# Patient Record
Sex: MALE | Race: WHITE | NOT HISPANIC OR LATINO | URBAN - METROPOLITAN AREA
[De-identification: names, ages, dates, MRNs, and addresses within clinical notes are randomized per-mention and may not be internally consistent; named-entity substitution may affect disease eponyms.]

---

## 2021-03-26 ENCOUNTER — EMERGENCY (EMERGENCY)
Facility: HOSPITAL | Age: 86
LOS: 1 days | Discharge: HOME | End: 2021-03-26
Attending: EMERGENCY MEDICINE | Admitting: EMERGENCY MEDICINE
Payer: MEDICARE

## 2021-03-26 VITALS
WEIGHT: 175.93 LBS | OXYGEN SATURATION: 96 % | SYSTOLIC BLOOD PRESSURE: 158 MMHG | DIASTOLIC BLOOD PRESSURE: 94 MMHG | TEMPERATURE: 98 F | HEART RATE: 97 BPM | RESPIRATION RATE: 18 BRPM

## 2021-03-26 LAB
APPEARANCE UR: ABNORMAL
BILIRUB UR-MCNC: NEGATIVE — SIGNIFICANT CHANGE UP
COLOR SPEC: YELLOW — SIGNIFICANT CHANGE UP
DIFF PNL FLD: ABNORMAL
GLUCOSE UR QL: NEGATIVE — SIGNIFICANT CHANGE UP
KETONES UR-MCNC: ABNORMAL
LEUKOCYTE ESTERASE UR-ACNC: ABNORMAL
NITRITE UR-MCNC: NEGATIVE — SIGNIFICANT CHANGE UP
PH UR: 5 — SIGNIFICANT CHANGE UP (ref 5–8)
PROT UR-MCNC: 30 MG/DL
SP GR SPEC: 1.02 — SIGNIFICANT CHANGE UP (ref 1.01–1.02)
UROBILINOGEN FLD QL: NEGATIVE — SIGNIFICANT CHANGE UP

## 2021-03-26 PROCEDURE — 99284 EMERGENCY DEPT VISIT MOD MDM: CPT

## 2021-03-26 PROCEDURE — 81001 URINALYSIS AUTO W/SCOPE: CPT

## 2021-03-26 PROCEDURE — 99283 EMERGENCY DEPT VISIT LOW MDM: CPT

## 2021-03-26 PROCEDURE — 87086 URINE CULTURE/COLONY COUNT: CPT

## 2021-03-26 RX ORDER — LIDOCAINE HCL 20 MG/ML
5 VIAL (ML) INJECTION ONCE
Refills: 0 | Status: DISCONTINUED | OUTPATIENT
Start: 2021-03-26 | End: 2021-03-26

## 2021-03-26 RX ORDER — LIDOCAINE HCL 20 MG/ML
5 VIAL (ML) INJECTION ONCE
Refills: 0 | Status: COMPLETED | OUTPATIENT
Start: 2021-03-26 | End: 2021-03-26

## 2021-03-26 RX ORDER — PHENAZOPYRIDINE HCL 100 MG
1 TABLET ORAL
Qty: 14 | Refills: 0
Start: 2021-03-26 | End: 2021-04-01

## 2021-03-26 RX ORDER — PHENAZOPYRIDINE HCL 100 MG
200 TABLET ORAL ONCE
Refills: 0 | Status: COMPLETED | OUTPATIENT
Start: 2021-03-26 | End: 2021-03-26

## 2021-03-26 RX ADMIN — Medication 200 MILLIGRAM(S): at 22:00

## 2021-03-26 RX ADMIN — Medication 6 MILLILITER(S): at 21:13

## 2021-03-26 NOTE — ED PROVIDER NOTE - NSFOLLOWUPINSTRUCTIONS_ED_ALL_ED_FT
1. TAKE ALL MEDICATIONS AS DIRECTED.  CONTINUE CEFTIN AS DIRECTED.  FOR PAIN YOU CAN TAKE IBUPROFEN (MOTRIN, ADVIL) OR ACETAMINOPHEN (TYLENOL) AS NEEDED, AS DIRECTED ON PACKAGING.  2. FOLLOW UP WITH ___UROLOGY_______ AS DIRECTED.  YOU WERE GIVEN COPIES OF ALL LABS AND IMAGING RESULTS FROM YOUR ER VISIT--PLEASE TAKE THEM WITH YOU TO YOUR APPOINTMENT.  3. IF NEEDED, CALL [phone] TO FIND A PRIMARY CARE PHYSICIAN.  OR CALL [phone] TO MAKE AN APPOINTMENT WITH THE MEDICAL CLINIC.  4. RETURN TO THE ER FOR ANY WORSENING SYMPTOMS.      Madera Catheter Placement and Care    WHAT YOU NEED TO KNOW:    What is a Madera catheter? A Madera catheter is a sterile tube that is inserted into your bladder to drain urine. It is also called an indwelling urinary catheter. The tip of the catheter has a small balloon filled with solution that holds the catheter in your bladder.                     How is a Madera catheter placed? Your genital area will be cleaned to prevent infection. The catheter will be inserted into your urethra. When urine begins to flow into the tubing, the balloon is filled to keep the catheter in place. Then, the open end will be attached to a drainage bag.     How do I care for my catheter and drainage bag? You can reduce your risk for infection and injury by caring for your catheter and drainage bag properly.  •Wash your hands often. Wash before and after you touch your catheter, tubing, or drainage bag. Use soap and water. Wear clean disposable gloves when you care for your catheter or disconnect the drainage bag. Wash your hands before you prepare or eat food.   Handwashing           •Clean your genital area 2 times every day. Clean your catheter area and anal opening after every bowel movement. ?For men: Use a soapy cloth to clean the tip of your penis. Start where the catheter enters. Wipe backward making sure to pull back the foreskin. Then use a cloth with clear water in the same direction to clean away the soap.       ?For women: Use a soapy cloth to clean the area that the catheter enters your body. Make sure to separate your labia and wipe toward the anus. Then use a cloth with clear water and wipe in the same direction.       •Secure the catheter tube so you do not pull or move the catheter. This helps prevent pain and bladder spasms. Healthcare providers will show you how to use medical tape or a strap to secure the catheter tube to your body.       •Keep a closed drainage system. Your catheter should always be attached to the drainage bag to form a closed system. Do not disconnect any part of the closed system unless you need to change the bag.      •Keep the drainage bag below the level of your waist. This helps stop urine from moving back up the tubing and into your bladder. Do not loop or kink the tubing. This can cause urine to back up and collect in your bladder. Do not let the drainage bag touch or lie on the floor.      •Empty the drainage bag when needed. The weight of a full drainage bag can be painful. Empty the drainage bag every 3 to 6 hours or when it is ? full.       •Clean and change the drainage bag as directed. Ask your healthcare provider how often you should change the drainage bag and what cleaning solution to use. Wear disposable gloves when you change the bag. Do not allow the end of the catheter or tubing to touch anything. Clean the ends with an alcohol pad before you reconnect them.      What can I do if problems develop?   •No urine is draining into the bag: ?Check for kinks in the tubing and straighten them out.       ?Check the tape or strap used to secure the catheter tube to your skin. Make sure it is not blocking the tube.       ?Make sure you are not sitting or lying on the tubing.      ?Make sure the urine bag is hanging below the level of your waist.      •Urine leaks from or around the catheter, tubing, or drainage bag: Check if the closed drainage system has accidently come open or apart. Clean the catheter and tubing ends with a new alcohol pad and reconnect them.       When should I seek immediate care?   •Your catheter comes out.       •You suddenly have material that looks like sand in the tubing or drainage bag.      •No urine is draining into the bag and you have checked the system.      •You have pain in your hip, back, pelvis, or lower abdomen.      •You are confused or cannot think clearly.      When should I call my doctor?   •You have a fever.      •You have bladder spasms for more than 1 day after the catheter is placed.      •You see blood in the tubing or drainage bag.      •You have a rash or itching where the catheter tube is secured to your skin.      •Urine leaks from or around the catheter, tubing, or drainage bag.      •The closed drainage system has accidently come open or apart.       •You see a layer of crystals inside the tubing.      •You have questions or concerns about your condition or care.      CARE AGREEMENT:    You have the right to help plan your care. Learn about your health condition and how it may be treated. Discuss treatment options with your healthcare providers to decide what care you want to receive. You always have the right to refuse treatment. 1. TAKE ALL MEDICATIONS AS DIRECTED.  CONTINUE CEFTIN AS DIRECTED.  FOR PAIN YOU CAN TAKE IBUPROFEN (MOTRIN, ADVIL) OR ACETAMINOPHEN (TYLENOL) AS NEEDED, AS DIRECTED ON PACKAGING.  2. FOLLOW UP WITH ___UROLOGY_______ AS DIRECTED.  YOU WERE GIVEN COPIES OF ALL LABS AND IMAGING RESULTS FROM YOUR ER VISIT--PLEASE TAKE THEM WITH YOU TO YOUR APPOINTMENT.  3. IF NEEDED, CALL [phone] TO FIND A PRIMARY CARE PHYSICIAN.  OR CALL [phone] TO MAKE AN APPOINTMENT WITH THE MEDICAL CLINIC.  4. RETURN TO THE ER FOR ANY WORSENING SYMPTOMS. 1. TAKE ALL MEDICATIONS AS DIRECTED.  DRINK PLENTY OF WATER TO STAY HYDRATED. START TAKING PYRIDIUM, THIS MAY MAKE YOUR YELLOW ORANGE. CONTINUE CEFTIN AS DIRECTED (antibiotic).  FOR PAIN YOU CAN TAKE IBUPROFEN (MOTRIN, ADVIL) OR ACETAMINOPHEN (TYLENOL) AS NEEDED, AS DIRECTED ON PACKAGING.  2. FOLLOW UP WITH ___UROLOGY_______ AS DIRECTED.  YOU WERE GIVEN COPIES OF ALL LABS AND IMAGING RESULTS FROM YOUR ER VISIT--PLEASE TAKE THEM WITH YOU TO YOUR APPOINTMENT.  3. IF NEEDED, CALL [phone] TO FIND A PRIMARY CARE PHYSICIAN.  OR CALL [phone] TO MAKE AN APPOINTMENT WITH THE MEDICAL CLINIC.  4. RETURN TO THE ER FOR ANY WORSENING SYMPTOMS.

## 2021-03-26 NOTE — ED PROVIDER NOTE - PROGRESS NOTE DETAILS
MD less than 200 cc in bladder per bladder scan indicating pt is emptying bladder with each void. Patient and son demanding nicolas. Son is pain management physician and requesting nicolas, states father in 10/10 pain at home and is already on ceftin. Risk of infection, CAUTI, sepsis, inability to void discussed. Call placed to Dr Green, awaiting return call. Pt feeling better after nicolas placed.  Awaiting return call form Dr Green. Nicolas care discussed with pt and son who indicated understanding. Pt feeling worser after nicolas placed.  States his penis feels irraited and has constant urge to void. Patient now would like foely removed. Discussed with son who in agreement that nicolas should be removed. Will removed. Awaiting return call form Dr Green.

## 2021-03-26 NOTE — ED PROVIDER NOTE - OBJECTIVE STATEMENT
86 y/o M with hx a fib, BPH on Ceftin x 8 days (out of 14 days) for prostatitis presents to ED for c/o painful urination and urinary retention. Son at [facility]. States has been having frequent urination but when he goes is only urinating small amounts. When he voids having pain in penis and rates pain 10/10. Son sates pt doubled over in pain when this occurs. No pain at rest. Deneis pain now. Does not feel empty after void. No fever chills n/v/d. No back or flank pain.     Urology Dr Green

## 2021-03-26 NOTE — ED PROVIDER NOTE - CLINICAL SUMMARY MEDICAL DECISION MAKING FREE TEXT BOX
86 y/o M with hx BPH currently on ceftin for prostatitis presents to ED for difficulty urinating and pain with urination, no fever chills or hematuria, plan= bladder scan to check for retention, ua and culture and urology consult

## 2021-03-26 NOTE — ED PROVIDER NOTE - CARE PROVIDER_API CALL
Milo Green  UROLOGY  [address], [address]  [place] [address]  Phone: [phone]  Fax: [phone]  Follow Up Time: 1-3 Days

## 2021-03-26 NOTE — ED PROVIDER NOTE - PATIENT PORTAL LINK FT
You can access the FollowMyHealth Patient Portal offered by [facility] by registering at the following website: [email or web address]. By joining [employer]’s FollowMyHealth portal, you will also be able to view your health information using other applications (apps) compatible with our system.

## 2021-03-26 NOTE — ED PROVIDER NOTE - ATTENDING CONTRIBUTION TO CARE
86 yo male with hx a fib, bph, requesting dx wi uti/prostatitis, c/o difficulty urinating. patient reports when he passes urine  it is painful  patient states he is urinating 15 times a day, and burns during urinating  denies any hematuria  denies nausea or vomiting   nad  abd soft, non tender  bladder scan - approx 100cc urine    patient and son request nicolas; discussed risks, but they state that they came for nicolas placement;

## 2021-03-26 NOTE — ED ADULT NURSE REASSESSMENT NOTE - NS ED NURSE REASSESS COMMENT FT1
bladder scan shows approx 100-125ml urine in pts bladder.  pt and son demanding a nicolas be placed.  pt will f/u with  on Monday.  Nicolas inserted to leg bag.  pt was uncomfortable with nicolas in place.  pt then requested nicolas be removed.  nicolas removed, pt voiding freely.  small amount of blood and residual lidocaine noted at meatus.  pt educated small amount of blood is expected as pt is on eliquis.  pt also instructed expected color change as he was prescribed pyridium.  declined having v/s taken prior to d/c home.  pt very eager to leave department.  pt left unit accompanied by his son, ambulating independently without incident.

## 2021-03-28 LAB
CULTURE RESULTS: NO GROWTH — SIGNIFICANT CHANGE UP
SPECIMEN SOURCE: SIGNIFICANT CHANGE UP

## 2021-03-31 ENCOUNTER — EMERGENCY (EMERGENCY)
Facility: HOSPITAL | Age: 86
LOS: 1 days | Discharge: HOME | End: 2021-03-31
Attending: EMERGENCY MEDICINE | Admitting: EMERGENCY MEDICINE
Payer: MEDICARE

## 2021-03-31 VITALS
TEMPERATURE: 98 F | WEIGHT: 167.99 LBS | RESPIRATION RATE: 18 BRPM | DIASTOLIC BLOOD PRESSURE: 71 MMHG | OXYGEN SATURATION: 96 % | SYSTOLIC BLOOD PRESSURE: 146 MMHG | HEIGHT: 69 IN | HEART RATE: 82 BPM

## 2021-03-31 LAB
APPEARANCE UR: ABNORMAL
BACTERIA # UR AUTO: ABNORMAL
BILIRUB UR-MCNC: ABNORMAL
COLOR SPEC: ABNORMAL
DIFF PNL FLD: ABNORMAL
EPI CELLS # UR: SIGNIFICANT CHANGE UP
GLUCOSE UR QL: NEGATIVE — SIGNIFICANT CHANGE UP
KETONES UR-MCNC: NEGATIVE — SIGNIFICANT CHANGE UP
LEUKOCYTE ESTERASE UR-ACNC: ABNORMAL
NITRITE UR-MCNC: POSITIVE
PH UR: 6 — SIGNIFICANT CHANGE UP (ref 5–8)
PROT UR-MCNC: 100
RBC CASTS # UR COMP ASSIST: >50 /HPF (ref 0–4)
SP GR SPEC: 1.01 — SIGNIFICANT CHANGE UP (ref 1.01–1.02)
UROBILINOGEN FLD QL: 1
WBC UR QL: ABNORMAL

## 2021-03-31 PROCEDURE — 99284 EMERGENCY DEPT VISIT MOD MDM: CPT

## 2021-03-31 PROCEDURE — 74176 CT ABD & PELVIS W/O CONTRAST: CPT

## 2021-03-31 PROCEDURE — 87086 URINE CULTURE/COLONY COUNT: CPT

## 2021-03-31 PROCEDURE — 99284 EMERGENCY DEPT VISIT MOD MDM: CPT | Mod: 25

## 2021-03-31 PROCEDURE — 81001 URINALYSIS AUTO W/SCOPE: CPT

## 2021-03-31 PROCEDURE — 74176 CT ABD & PELVIS W/O CONTRAST: CPT | Mod: 26,MA

## 2021-03-31 PROCEDURE — 51702 INSERT TEMP BLADDER CATH: CPT

## 2021-03-31 NOTE — ED PROVIDER NOTE - ATTENDING CONTRIBUTION TO CARE
84 y/o M with recurrent UTI and retention sent in for nicolas.  pt to follow up with Dr. Green this week.  well appearing no distress  suprapubic tenderness to palpation  bladder distention    symptoms improved with nicolas insertion    nicolas, UA, CT

## 2021-03-31 NOTE — ED PROVIDER NOTE - NSFOLLOWUPINSTRUCTIONS_ED_ALL_ED_FT
Please follow up with Dr. Green continue your antibiotics and drink plenty of water   return to er for any worsening symptoms    Urinary Retention in Men    WHAT YOU NEED TO KNOW:    What is urinary retention? Urinary retention is a condition that develops when your bladder does not empty completely when you urinate.    Male Reproductive System         What causes urinary retention?   •An enlarged prostate      •Blockages, such as a stone, growth, or narrowing of your urethra      •A weak bladder muscle      •Nerve damage from diabetes, stroke, or spinal cord injury      •Bladder diverticula, which are pockets of urine that form in your bladder and do not empty      •Certain medicines, such as narcotics, antihistamines, or antidepressants      What are the signs and symptoms of urinary retention?   •Frequent urination, or the urge to urinate right after you finish      •An urge to urinate, but your urine does not come out or dribbles out slowly and weakly      •Frequent urine leaks that happen during the day or while you sleep      •Pain or pressure when you urinate      •Pain or stiffness in your abdomen, lower back, hips, or upper thighs      •Blood in your urine      How is urinary retention diagnosed? Your healthcare provider will ask about your health history and the medicines you take. He will press or tap on your lower abdomen. You may need any of the following tests:   •A digital rectal exam is when healthcare providers carefully feel the size of your prostate.      •A post void residual test will show how much urine is left in your bladder after you urinate. You will be asked to urinate and then healthcare providers will use a small ultrasound machine to check how much urine is left in your bladder.      •Blood or urine tests may show infection or prostate specific antigen (PSA) levels. PSA may be elevated in prostate cancer.      •An ultrasound uses sound waves to show pictures on a monitor. An ultrasound may be done to show bladder stones, infection, or other problems.      •A CT scan, or CAT scan, is a type of x-ray that is taken of your prostate, kidneys, and bladder. The pictures may show what is causing your urinary retention. You may be given a dye before the pictures are taken to help healthcare providers see the pictures better. Tell the healthcare provider if you have ever had an allergic reaction to contrast dye.      How is urinary retention treated?   •A Madera catheter is a tube put into your bladder to drain urine into a bag. Keep the bag below your waist. This will prevent urine from flowing back into your bladder and causing an infection or other problems. Also, keep the tube free of kinks so the urine will drain properly. Do not pull on the catheter. This can cause pain and bleeding, and may cause the catheter to come out.       •Medicines can help decrease the size of your prostate, fight infection, and help you urinate more easily.      •Surgery may be needed to treat the condition that is causing your urinary retention.       When should I contact my healthcare provider?   •You have a fever.      •You have pain when you urinate.      •You have blood in your urine.      •You have problems with your catheter.      •You have questions or concerns about your condition or care.      When should I seek immediate care or call 911?   •You have severe abdominal pain.      •You are breathing faster than usual.      •Your heartbeat is faster than usual.      •Your face, hands, feet, or ankles are swollen.       CARE AGREEMENT:    You have the right to help plan your care. Learn about your health condition and how it may be treated. Discuss treatment options with your healthcare providers to decide what care you want to receive. You always have the right to refuse treatment.

## 2021-03-31 NOTE — ED PROVIDER NOTE - PATIENT PORTAL LINK FT
You can access the FollowMyHealth Patient Portal offered by [facility] by registering at the following website: [email or web address]. By joining [employer]’s FollowMyHealth portal, you will also be able to view your health information using other applications (apps) compatible with our system.

## 2021-03-31 NOTE — ED ADULT NURSE NOTE - NSIMPLEMENTINTERV_GEN_ALL_ED
Implemented All Universal Safety Interventions:  [place] to call system. Call bell, personal items and telephone within reach. Instruct patient to call for assistance. Room bathroom lighting operational. Non-slip footwear when patient is off stretcher. Physically safe environment: no spills, clutter or unnecessary equipment. Stretcher in lowest position, wheels locked, appropriate side rails in place.

## 2021-03-31 NOTE — ED PROVIDER NOTE - CARE PROVIDER_API CALL
Milo Green  UROLOGY  [address], [address]  [place] [address]  Phone: [phone]  Fax: [phone]  Follow Up Time:

## 2021-03-31 NOTE — ED ADULT TRIAGE NOTE - CHIEF COMPLAINT QUOTE
patient came in ED from home BIBA with c/o dysuria X 12 days, today patient added he did not urinate. patient completed Cefuroxime PO and still takes Pyridium.

## 2021-03-31 NOTE — ED PROVIDER NOTE - CLINICAL SUMMARY MEDICAL DECISION MAKING FREE TEXT BOX
Pt is a 86 yo male with urinary retention will place nicolas orange tinge due to pyridium u/a sent ct scan ordered

## 2021-03-31 NOTE — ED PROVIDER NOTE - PROGRESS NOTE DETAILS
+ uti possible skewed since on pyridium pt already on ceftin   has f/u with Dr. Green + uti possible skewed since on pyridium pt already on ceftin   has f/u with Dr. Green in 2 days ct shows enlarged prostate d/c with nicolas leg bag

## 2021-03-31 NOTE — ED PROVIDER NOTE - OBJECTIVE STATEMENT
Pt is a 84 yo male with hx a fib on Eliquis, BPH on Ceftin for prostatitis presents to ED for c/o painful urination and urinary retention since 1 pm today. States has been having frequent urination but when he goes is only urinating small amounts. When he voids having pain in penis and rates pain 10/10. No fever chills n/v/d. No back or flank pain. Pt was here 4 days ago and refused to keep nicolas in has appt with Dr. Green in 2 days and was advised to get ct scan. Pt is on pyridium and ceftin.     Urology Dr Green

## 2021-04-01 PROBLEM — I48.91 UNSPECIFIED ATRIAL FIBRILLATION: Chronic | Status: ACTIVE | Noted: 2021-03-26

## 2021-04-01 PROBLEM — N40.0 BENIGN PROSTATIC HYPERPLASIA WITHOUT LOWER URINARY TRACT SYMPTOMS: Chronic | Status: ACTIVE | Noted: 2021-03-26

## 2021-04-01 LAB
CULTURE RESULTS: NO GROWTH — SIGNIFICANT CHANGE UP
SPECIMEN SOURCE: SIGNIFICANT CHANGE UP

## 2021-04-23 NOTE — ED ADULT NURSE NOTE - CAS TRG GENERAL NORM CIRC DET
Azathioprine Counseling:  I discussed with the patient the risks of azathioprine including but not limited to myelosuppression, immunosuppression, hepatotoxicity, lymphoma, and infections.  The patient understands that monitoring is required including baseline LFTs, Creatinine, possible TPMP genotyping and weekly CBCs for the first month and then every 2 weeks thereafter.  The patient verbalized understanding of the proper use and possible adverse effects of azathioprine.  All of the patient's questions and concerns were addressed. Strong peripheral pulses

## 2022-11-27 ENCOUNTER — INPATIENT (INPATIENT)
Facility: HOSPITAL | Age: 87
LOS: 3 days | Discharge: HOME | DRG: 871 | End: 2022-12-01
Attending: FAMILY MEDICINE | Admitting: FAMILY MEDICINE
Payer: MEDICARE

## 2022-11-27 VITALS
RESPIRATION RATE: 18 BRPM | TEMPERATURE: 102 F | WEIGHT: 173.06 LBS | SYSTOLIC BLOOD PRESSURE: 132 MMHG | OXYGEN SATURATION: 88 % | HEIGHT: 69 IN | HEART RATE: 101 BPM | DIASTOLIC BLOOD PRESSURE: 65 MMHG

## 2022-11-27 DIAGNOSIS — U07.1 COVID-19: ICD-10-CM

## 2022-11-27 DIAGNOSIS — I48.91 UNSPECIFIED ATRIAL FIBRILLATION: ICD-10-CM

## 2022-11-27 DIAGNOSIS — Z29.9 ENCOUNTER FOR PROPHYLACTIC MEASURES, UNSPECIFIED: ICD-10-CM

## 2022-11-27 DIAGNOSIS — Z79.899 OTHER LONG TERM (CURRENT) DRUG THERAPY: ICD-10-CM

## 2022-11-27 DIAGNOSIS — R31.9 HEMATURIA, UNSPECIFIED: ICD-10-CM

## 2022-11-27 DIAGNOSIS — J18.9 PNEUMONIA, UNSPECIFIED ORGANISM: ICD-10-CM

## 2022-11-27 DIAGNOSIS — N40.0 BENIGN PROSTATIC HYPERPLASIA WITHOUT LOWER URINARY TRACT SYMPTOMS: ICD-10-CM

## 2022-11-27 LAB
ALBUMIN SERPL ELPH-MCNC: 3 G/DL — LOW (ref 3.3–5)
ALP SERPL-CCNC: 87 U/L — SIGNIFICANT CHANGE UP (ref 40–120)
ALT FLD-CCNC: 37 U/L — SIGNIFICANT CHANGE UP (ref 12–78)
ANION GAP SERPL CALC-SCNC: 5 MMOL/L — SIGNIFICANT CHANGE UP (ref 5–17)
APPEARANCE UR: ABNORMAL
APTT BLD: 34.6 SEC — SIGNIFICANT CHANGE UP (ref 27.5–35.5)
AST SERPL-CCNC: 35 U/L — SIGNIFICANT CHANGE UP (ref 15–37)
BASE EXCESS BLDV CALC-SCNC: -1.8 MMOL/L — SIGNIFICANT CHANGE UP (ref -2–3)
BASOPHILS # BLD AUTO: 0 K/UL — SIGNIFICANT CHANGE UP (ref 0–0.2)
BASOPHILS NFR BLD AUTO: 0 % — SIGNIFICANT CHANGE UP (ref 0–2)
BILIRUB SERPL-MCNC: 1 MG/DL — SIGNIFICANT CHANGE UP (ref 0.2–1.2)
BILIRUB UR-MCNC: NEGATIVE — SIGNIFICANT CHANGE UP
BLOOD GAS COMMENTS, VENOUS: SIGNIFICANT CHANGE UP
BUN SERPL-MCNC: 9 MG/DL — SIGNIFICANT CHANGE UP (ref 7–23)
CALCIUM SERPL-MCNC: 8.2 MG/DL — LOW (ref 8.5–10.1)
CHLORIDE SERPL-SCNC: 108 MMOL/L — SIGNIFICANT CHANGE UP (ref 96–108)
CO2 SERPL-SCNC: 28 MMOL/L — SIGNIFICANT CHANGE UP (ref 22–31)
COLOR SPEC: YELLOW — SIGNIFICANT CHANGE UP
CREAT SERPL-MCNC: 0.83 MG/DL — SIGNIFICANT CHANGE UP (ref 0.5–1.3)
CRP SERPL-MCNC: 13 MG/L — HIGH
DIFF PNL FLD: ABNORMAL
EGFR: 85 ML/MIN/1.73M2 — SIGNIFICANT CHANGE UP
EOSINOPHIL # BLD AUTO: 0.11 K/UL — SIGNIFICANT CHANGE UP (ref 0–0.5)
EOSINOPHIL NFR BLD AUTO: 1 % — SIGNIFICANT CHANGE UP (ref 0–6)
EPI CELLS # UR: SIGNIFICANT CHANGE UP
FLUAV AG NPH QL: SIGNIFICANT CHANGE UP
FLUBV AG NPH QL: SIGNIFICANT CHANGE UP
GAS PNL BLDV: SIGNIFICANT CHANGE UP
GLUCOSE SERPL-MCNC: 110 MG/DL — HIGH (ref 70–99)
GLUCOSE UR QL: NEGATIVE — SIGNIFICANT CHANGE UP
HCO3 BLDV-SCNC: 22 MMOL/L — SIGNIFICANT CHANGE UP (ref 22–29)
HCT VFR BLD CALC: 37.2 % — LOW (ref 39–50)
HCT VFR BLD CALC: 38.4 % — LOW (ref 39–50)
HGB BLD-MCNC: 12.5 G/DL — LOW (ref 13–17)
HGB BLD-MCNC: 13 G/DL — SIGNIFICANT CHANGE UP (ref 13–17)
INR BLD: 1.22 RATIO — HIGH (ref 0.88–1.16)
KETONES UR-MCNC: NEGATIVE — SIGNIFICANT CHANGE UP
LACTATE SERPL-SCNC: 1.7 MMOL/L — SIGNIFICANT CHANGE UP (ref 0.7–2)
LEUKOCYTE ESTERASE UR-ACNC: NEGATIVE — SIGNIFICANT CHANGE UP
LYMPHOCYTES # BLD AUTO: 0.21 K/UL — LOW (ref 1–3.3)
LYMPHOCYTES # BLD AUTO: 2 % — LOW (ref 13–44)
MANUAL SMEAR VERIFICATION: SIGNIFICANT CHANGE UP
MCHC RBC-ENTMCNC: 31.6 PG — SIGNIFICANT CHANGE UP (ref 27–34)
MCHC RBC-ENTMCNC: 33.9 GM/DL — SIGNIFICANT CHANGE UP (ref 32–36)
MCV RBC AUTO: 93.2 FL — SIGNIFICANT CHANGE UP (ref 80–100)
MONOCYTES # BLD AUTO: 0 K/UL — SIGNIFICANT CHANGE UP (ref 0–0.9)
MONOCYTES NFR BLD AUTO: 0 % — LOW (ref 2–14)
NEUTROPHILS # BLD AUTO: 10.2 K/UL — HIGH (ref 1.8–7.4)
NEUTROPHILS NFR BLD AUTO: 87 % — HIGH (ref 43–77)
NEUTS BAND # BLD: 10 % — HIGH (ref 0–8)
NITRITE UR-MCNC: NEGATIVE — SIGNIFICANT CHANGE UP
NRBC # BLD: 0 — SIGNIFICANT CHANGE UP
NRBC # BLD: SIGNIFICANT CHANGE UP /100 WBCS (ref 0–0)
PCO2 BLDV: 33 MMHG — LOW (ref 42–55)
PH BLDV: 7.44 — HIGH (ref 7.32–7.43)
PH UR: 5 — SIGNIFICANT CHANGE UP (ref 5–8)
PLAT MORPH BLD: NORMAL — SIGNIFICANT CHANGE UP
PLATELET # BLD AUTO: 193 K/UL — SIGNIFICANT CHANGE UP (ref 150–400)
PO2 BLDV: 152 MMHG — HIGH (ref 25–45)
POTASSIUM SERPL-MCNC: 3.3 MMOL/L — LOW (ref 3.5–5.3)
POTASSIUM SERPL-SCNC: 3.3 MMOL/L — LOW (ref 3.5–5.3)
PROCALCITONIN SERPL-MCNC: 0.89 NG/ML — HIGH
PROT SERPL-MCNC: 6.5 G/DL — SIGNIFICANT CHANGE UP (ref 6–8.3)
PROT UR-MCNC: 30 MG/DL
PROTHROM AB SERPL-ACNC: 14.3 SEC — HIGH (ref 10.5–13.4)
RBC # BLD: 4.12 M/UL — LOW (ref 4.2–5.8)
RBC # FLD: 13.3 % — SIGNIFICANT CHANGE UP (ref 10.3–14.5)
RBC BLD AUTO: NORMAL — SIGNIFICANT CHANGE UP
RBC CASTS # UR COMP ASSIST: >50 /HPF (ref 0–4)
RSV RNA NPH QL NAA+NON-PROBE: SIGNIFICANT CHANGE UP
SAO2 % BLDV: 99.5 % — HIGH (ref 67–88)
SARS-COV-2 RNA SPEC QL NAA+PROBE: DETECTED
SODIUM SERPL-SCNC: 141 MMOL/L — SIGNIFICANT CHANGE UP (ref 135–145)
SP GR SPEC: 1.01 — SIGNIFICANT CHANGE UP (ref 1.01–1.02)
UROBILINOGEN FLD QL: NEGATIVE — SIGNIFICANT CHANGE UP
WBC # BLD: 10.52 K/UL — HIGH (ref 3.8–10.5)
WBC # FLD AUTO: 10.52 K/UL — HIGH (ref 3.8–10.5)
WBC UR QL: SIGNIFICANT CHANGE UP

## 2022-11-27 PROCEDURE — 99285 EMERGENCY DEPT VISIT HI MDM: CPT

## 2022-11-27 PROCEDURE — 71250 CT THORAX DX C-: CPT | Mod: 26,MA

## 2022-11-27 PROCEDURE — 71045 X-RAY EXAM CHEST 1 VIEW: CPT | Mod: 26

## 2022-11-27 PROCEDURE — 93010 ELECTROCARDIOGRAM REPORT: CPT

## 2022-11-27 PROCEDURE — 99223 1ST HOSP IP/OBS HIGH 75: CPT | Mod: GC

## 2022-11-27 RX ORDER — SODIUM CHLORIDE 9 MG/ML
2400 INJECTION INTRAMUSCULAR; INTRAVENOUS; SUBCUTANEOUS ONCE
Refills: 0 | Status: COMPLETED | OUTPATIENT
Start: 2022-11-27 | End: 2022-11-27

## 2022-11-27 RX ORDER — APIXABAN 2.5 MG/1
0 TABLET, FILM COATED ORAL
Qty: 0 | Refills: 0 | DISCHARGE

## 2022-11-27 RX ORDER — REMDESIVIR 5 MG/ML
100 INJECTION INTRAVENOUS EVERY 24 HOURS
Refills: 0 | Status: COMPLETED | OUTPATIENT
Start: 2022-11-28 | End: 2022-12-01

## 2022-11-27 RX ORDER — POLYETHYLENE GLYCOL 3350 17 G/17G
17 POWDER, FOR SOLUTION ORAL DAILY
Refills: 0 | Status: DISCONTINUED | OUTPATIENT
Start: 2022-11-27 | End: 2022-12-01

## 2022-11-27 RX ORDER — APIXABAN 2.5 MG/1
5 TABLET, FILM COATED ORAL
Refills: 0 | Status: DISCONTINUED | OUTPATIENT
Start: 2022-11-27 | End: 2022-12-01

## 2022-11-27 RX ORDER — TAMSULOSIN HYDROCHLORIDE 0.4 MG/1
0.4 CAPSULE ORAL AT BEDTIME
Refills: 0 | Status: DISCONTINUED | OUTPATIENT
Start: 2022-11-27 | End: 2022-12-01

## 2022-11-27 RX ORDER — AZITHROMYCIN 500 MG/1
500 TABLET, FILM COATED ORAL ONCE
Refills: 0 | Status: COMPLETED | OUTPATIENT
Start: 2022-11-27 | End: 2022-11-27

## 2022-11-27 RX ORDER — AZITHROMYCIN 500 MG/1
500 TABLET, FILM COATED ORAL EVERY 24 HOURS
Refills: 0 | Status: DISCONTINUED | OUTPATIENT
Start: 2022-11-28 | End: 2022-11-29

## 2022-11-27 RX ORDER — FINASTERIDE 5 MG/1
5 TABLET, FILM COATED ORAL DAILY
Refills: 0 | Status: DISCONTINUED | OUTPATIENT
Start: 2022-11-27 | End: 2022-12-01

## 2022-11-27 RX ORDER — ACETAMINOPHEN 500 MG
650 TABLET ORAL EVERY 6 HOURS
Refills: 0 | Status: DISCONTINUED | OUTPATIENT
Start: 2022-11-27 | End: 2022-12-01

## 2022-11-27 RX ORDER — CEFTRIAXONE 500 MG/1
1000 INJECTION, POWDER, FOR SOLUTION INTRAMUSCULAR; INTRAVENOUS ONCE
Refills: 0 | Status: COMPLETED | OUTPATIENT
Start: 2022-11-27 | End: 2022-11-27

## 2022-11-27 RX ORDER — POTASSIUM CHLORIDE 20 MEQ
40 PACKET (EA) ORAL EVERY 4 HOURS
Refills: 0 | Status: COMPLETED | OUTPATIENT
Start: 2022-11-27 | End: 2022-11-27

## 2022-11-27 RX ORDER — TAMSULOSIN HYDROCHLORIDE 0.4 MG/1
0 CAPSULE ORAL
Qty: 0 | Refills: 0 | DISCHARGE

## 2022-11-27 RX ORDER — DEXAMETHASONE 0.5 MG/5ML
6 ELIXIR ORAL DAILY
Refills: 0 | Status: DISCONTINUED | OUTPATIENT
Start: 2022-11-27 | End: 2022-12-01

## 2022-11-27 RX ORDER — ONDANSETRON 8 MG/1
4 TABLET, FILM COATED ORAL EVERY 8 HOURS
Refills: 0 | Status: DISCONTINUED | OUTPATIENT
Start: 2022-11-27 | End: 2022-12-01

## 2022-11-27 RX ORDER — LANOLIN ALCOHOL/MO/W.PET/CERES
3 CREAM (GRAM) TOPICAL AT BEDTIME
Refills: 0 | Status: DISCONTINUED | OUTPATIENT
Start: 2022-11-27 | End: 2022-12-01

## 2022-11-27 RX ORDER — REMDESIVIR 5 MG/ML
INJECTION INTRAVENOUS
Refills: 0 | Status: COMPLETED | OUTPATIENT
Start: 2022-11-27 | End: 2022-12-01

## 2022-11-27 RX ORDER — ACETAMINOPHEN 500 MG
650 TABLET ORAL ONCE
Refills: 0 | Status: COMPLETED | OUTPATIENT
Start: 2022-11-27 | End: 2022-11-27

## 2022-11-27 RX ORDER — CEFTRIAXONE 500 MG/1
1000 INJECTION, POWDER, FOR SOLUTION INTRAMUSCULAR; INTRAVENOUS EVERY 24 HOURS
Refills: 0 | Status: COMPLETED | OUTPATIENT
Start: 2022-11-28 | End: 2022-12-01

## 2022-11-27 RX ORDER — POTASSIUM CHLORIDE 20 MEQ
20 PACKET (EA) ORAL ONCE
Refills: 0 | Status: DISCONTINUED | OUTPATIENT
Start: 2022-11-27 | End: 2022-11-27

## 2022-11-27 RX ORDER — REMDESIVIR 5 MG/ML
200 INJECTION INTRAVENOUS EVERY 24 HOURS
Refills: 0 | Status: COMPLETED | OUTPATIENT
Start: 2022-11-27 | End: 2022-11-27

## 2022-11-27 RX ORDER — METOPROLOL TARTRATE 50 MG
25 TABLET ORAL EVERY 12 HOURS
Refills: 0 | Status: DISCONTINUED | OUTPATIENT
Start: 2022-11-27 | End: 2022-12-01

## 2022-11-27 RX ADMIN — Medication 650 MILLIGRAM(S): at 17:23

## 2022-11-27 RX ADMIN — Medication 40 MILLIEQUIVALENT(S): at 10:38

## 2022-11-27 RX ADMIN — Medication 40 MILLIEQUIVALENT(S): at 08:16

## 2022-11-27 RX ADMIN — FINASTERIDE 5 MILLIGRAM(S): 5 TABLET, FILM COATED ORAL at 14:04

## 2022-11-27 RX ADMIN — Medication 25 MILLIGRAM(S): at 17:23

## 2022-11-27 RX ADMIN — TAMSULOSIN HYDROCHLORIDE 0.4 MILLIGRAM(S): 0.4 CAPSULE ORAL at 21:07

## 2022-11-27 RX ADMIN — REMDESIVIR 500 MILLIGRAM(S): 5 INJECTION INTRAVENOUS at 10:49

## 2022-11-27 RX ADMIN — SODIUM CHLORIDE 2400 MILLILITER(S): 9 INJECTION INTRAMUSCULAR; INTRAVENOUS; SUBCUTANEOUS at 06:15

## 2022-11-27 RX ADMIN — AZITHROMYCIN 255 MILLIGRAM(S): 500 TABLET, FILM COATED ORAL at 06:45

## 2022-11-27 RX ADMIN — CEFTRIAXONE 100 MILLIGRAM(S): 500 INJECTION, POWDER, FOR SOLUTION INTRAMUSCULAR; INTRAVENOUS at 06:15

## 2022-11-27 RX ADMIN — APIXABAN 5 MILLIGRAM(S): 2.5 TABLET, FILM COATED ORAL at 17:23

## 2022-11-27 RX ADMIN — Medication 650 MILLIGRAM(S): at 05:45

## 2022-11-27 NOTE — CONSULT NOTE ADULT - ASSESSMENT
86y Male complaining of shortness of breath.    BPH  AF   86y Male complaining of shortness of breath.    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

## 2022-11-27 NOTE — H&P ADULT - PROBLEM SELECTOR PLAN 7
On Eliquis, additional DVT prophylaxis not needed at this time    Dispo  -PT consult. On Eliquis, additional DVT prophylaxis not needed at this time    Dispo  -PT/social work consult

## 2022-11-27 NOTE — ED PROVIDER NOTE - CARE PLAN
1 Principal Discharge DX:	Pneumonia  Secondary Diagnosis:	Hypoxia   Principal Discharge DX:	Pneumonia due to COVID-19 virus  Secondary Diagnosis:	Hypoxia

## 2022-11-27 NOTE — PHYSICAL THERAPY INITIAL EVALUATION ADULT - ADDITIONAL COMMENTS
Patient with some confusion. Per wife (at bedside) , [place] steps to enter, 1 flight of stairs to 2nd floor bedroom and bath, +R descending rail. No DME available in the home. Patient is a community ambulator; also rides his bicycle around the [place] community. Patient with no hx of falls.  Wife drives patient to social activities, and errands outside community.

## 2022-11-27 NOTE — ED ADULT NURSE NOTE - SUICIDE SCREENING QUESTION 2
[facility] Obstetrics Brief Operative Note    Pre-operative diagnosis: IUP at 39w0d  History of previous  x 2  Advanced maternal age  Inadequate prenatal care   Post-operative diagnosis: Same  Significant scarring of the uterus and bladder to the anterior abdominal wall   Procedure: Repeat low transverse  section   Surgeon: Franci Hay MD   Assistant(s): Gal Sheriff MD   Anesthesia: Spinal anesthesia   Estimated blood loss: QBL pending   Total IV fluids: 2000 mL   Blood transfusion: No transfusion was given during surgery   Total urine output: 200ml   Drains: [place] catheter   Specimens: None   Findings: Single liveborn female infant in cephalic presentation. Apgars of 8 and 9. Weight 3130 grams. There was significant scarring of the lower uterine segment and bladder to the anterior abdominal wall fascia that required extensive lysis of adhesions. Uterine serosa and small layer of muscle was disrupted superior to the incision related to lysis of adhesions that did require repair for hemostasis.  Normal ovaries and fallopian tubes. Based on surgical findings, I would strongly recommend additional C-sections being completed using a vertical midline incision. I did discuss this with the patient following completion of surgery.     Complications: None.   Condition: Infant stable, transferred to general care nursery  Mother stable, transfered to post-anesthesia recovery   Comments: See dictated operative report for full details     Ashley Hieronimus, MD [facility]  2019 10:20 AM            
No

## 2022-11-27 NOTE — ED ADULT NURSE NOTE - OBJECTIVE STATEMENT
patient a/o x 4 with a calm affect c/o shortness of breath and fever.  patient denies chest pains. patient noticed to have blood in urine, wife states that patient has had urinary issues in the past.  EKG completed, patient placed on cardiac monitor.  patient tolerating nasal cannula well

## 2022-11-27 NOTE — ED PROVIDER NOTE - LAB INTERPRETATION
Flu With COVID-19 By JOHNATHAN (11.27.22 @ 06:15)   SARS-CoV-2 Result: Detected: This Respiratory Panel uses polymerase chain reaction (PCR) to detect for   influenza A; influenza B; respiratory syncytial virus; and SARS-CoV-2.   Influenza A Result: NotDetec   Influenza B Result: NotDetec   Resp Syn Virus Result: NotDetec

## 2022-11-27 NOTE — ED PROVIDER NOTE - CLINICAL SUMMARY MEDICAL DECISION MAKING FREE TEXT BOX
Patient brought in by EMS for shortness of breath fever productive cough.  Patient denies headache chest pain abdominal pain nausea vomiting diarrhea dysuria.  plan sepsis work-up including EKG chest x-ray labs IV fluid Tylenol antibiotics

## 2022-11-27 NOTE — H&P ADULT - NSHPREVIEWOFSYSTEMS_GEN_ALL_CORE
Constitutional: + CHILLS. denies fever, sweating  HEENT: denies headache, dizziness, or lightheadedness  Respiratory: + SOB, cough, wheezing  Cardiovascular: denies CP, palpitations  Gastrointestinal: + Constipation. denies nausea, vomiting, diarrhea, abdominal pain, or bloody stools  Genitourinary: + Pain with urination  Skin/Breast: denies rashes or itching  Musculoskeletal: denies muscle aches, joint swelling, or muscle weakness  Neurologic: denies loss of sensation, numbness, or tingling  ROS negative except as noted above

## 2022-11-27 NOTE — H&P ADULT - PROBLEM SELECTOR PLAN 5
- Previously followed Dr. Green  - W/ history of repeated UTI, prostatitis, urinary retention requiring Madera ca  - Continue home tamsulosin w/ hold parameters - Previously followed Dr. Green  - W/ history of repeated UTI, prostatitis, urinary retention requiring Madera cath  - Continue home tamsulosin w/ hold parameters - Previously followed Dr. Green  - W/ history of repeated UTI, prostatitis, urinary retention requiring Madera cath  - Continue home tamsulosin w/ hold parameters (pending med rec) - Previously followed Dr. Green  - W/ history of repeated UTI, prostatitis, urinary retention requiring Madera cath  - Continue home tamsulosin w/ hold parameters, and Dutasteride therapeutic interchange

## 2022-11-27 NOTE — H&P ADULT - PROBLEM SELECTOR PLAN 3
W/ gross hematuria prior to presentation, UA positive for gross blood, >50RBC, 30 protein  - On Eliquis for afib  - Previously followed Dr. Green (urology)  - W/ history of repeated UTI, prostatitis, urinary retention requiring [place] cath  - Continuous bladder irrigation and repeat UA, urology consult if not resolved W/ gross hematuria prior to presentation, UA positive for gross blood, >50RBC, 30 protein  No gross hematuria observed on exam  - On Eliquis for afib  - Previously followed Dr. Green (urology)  - W/ history of repeated UTI, prostatitis, urinary retention requiring [place] cath  - Continuous bladder irrigation and repeat UA, urology consult if not resolved W/ gross hematuria prior to presentation, UA positive for gross blood, >50RBC, 30 protein  No gross hematuria observed on exam- dried blood noted on meatus  - On Eliquis for afib  - Previously followed Dr. Green (urology)  - Patient W/ history of repeated UTI, prostatitis, urinary retention requiring [place] cath  - Discussed with urology, patient with very large prostate- likely was irritated, if patient not having difficulty urinating, and no deepika blood in urine (urine clear in urinal at bedside), can continue eliquis and monitor H/H

## 2022-11-27 NOTE — H&P ADULT - PROBLEM SELECTOR PLAN 2
Pt meeting sepsis criteria on admission  - Satting well on 2L NC  - CXR as above  - XXXX since symptom onset   - maintain isolation precautions - airborne and contact  - monitor for signs and symptoms of worsening infection, SOB, O2 desaturation  - Remdesivir x3 days  - ID consult Dr. Barraza Pt meeting sepsis criteria on admission  - Satting well on 2L NC  - CXR as above  - 1 day since symptom onset   - maintain isolation precautions - airborne and contact  - monitor for signs and symptoms of worsening infection, SOB, O2 desaturation  - Remdesivir x3 days  - ID consult Dr. Barraza Pt meeting sepsis criteria on admission  - Satting well on 2L NC  - CXR as above  - 1 day since symptom onset   - maintain isolation precautions - airborne and contact  - monitor for signs and symptoms of worsening infection, SOB, O2 desaturation  - Remdesivir x3 days  - Oral decadron ordered by pulm  - ID consult Dr. Barraza Pt meeting sepsis criteria on admission  - Satting well on 2L NC  - CXR as above  - 1 day since symptom onset   - maintain isolation precautions - airborne and contact  - monitor for signs and symptoms of worsening infection, SOB, O2 desaturation  - Remdesivir x3 days  - Oral decadron ordered by pulm (Dr. Nunez)  - ID consult Dr. Barraza Pt meeting sepsis criteria on admission  - Satting well on 2L NC  - CXR as above  - 1 day since symptom onset   - maintain isolation precautions - airborne and contact  - monitor for signs and symptoms of worsening infection, SOB, O2 desaturation  - Remdesivir x3-5 days  - Oral decadron ordered by pulm (Dr. Nunez)  - ID consult Dr. Barraza

## 2022-11-27 NOTE — ED ADULT NURSE REASSESSMENT NOTE - NS ED NURSE REASSESS COMMENT FT1
Pt having bleeding from penis.  Dr Gruber aware.  Pt ok to Eliquis as per Dr Green as well.  H and H stable.
Received pt in bed alert and oriented.  Wife/son at bedside on and off.  Pt comfortable at this time.  VS stable.  02 95% 3L.  Awaiting bed

## 2022-11-27 NOTE — PHYSICAL THERAPY INITIAL EVALUATION ADULT - PERTINENT HX OF CURRENT PROBLEM, REHAB EVAL
Pt is an 85 y/o male with hx a fib on Eliquis and BPH who presents to the hospital c/o shortness of breath. The day prior to admission patient notes he began to experience chest pain that was non-radiating as well as SOB. Overnight the patient's wife reports that the patient tried to go to the bathroom and was off balance and experiencing "poor breathing." Pt endorses productive cough also x 1 day, brown/pink tinged. Patient and wife do not endorse any sick contacts. Associated symptoms include + chills, + productive cough, + "wheezing". Patient denies feeling febrile, CP, n/v, diarrhea.

## 2022-11-27 NOTE — H&P ADULT - NSHPSOCIALHISTORY_GEN_ALL_CORE
Lives w/ wife  Tobacco: Former, 80 pack year per son  Alcohol: Rare  Drugs: None  ADLs: Ambulates w/o assistance

## 2022-11-27 NOTE — H&P ADULT - PROBLEM SELECTOR PLAN 1
Pt symptomatic, febrile w/ leukocytosis and bandemia, tachycardia on admission w/ CXR findings--meets sepsis criteria  - S/p 2.4L NC, ceftriaxone, azithromycin in ED  - 11/27 CXR: Wet read increased opacities LLL and R lower lung  - 11/27 CT awaiting read  - Found to be COVID+, RVP negative otherwise  - Continue ceftriaxone and azithromycin for epimeric coverage  - Satting well on 2L NC; Continue O2 support, ween and titrate as needed  - Monitor for signs and symptoms of worsening infection, SOB, O2 desaturation  - Admit to tele Pt symptomatic, febrile w/ leukocytosis and bandemia, tachycardia on admission w/ CXR findings--meets sepsis criteria  - S/p 2.4L NC, ceftriaxone, azithromycin in ED  - 11/27 CXR: Wet read increased opacities LLL and R lower lung  - 11/27 CT awaiting read  - Found to be COVID+, RVP negative otherwise  - Continue ceftriaxone and azithromycin for epimeric coverage  - F/u BCx x2 and UCx, MRSA PCR  - Satting well on 2L NC; Continue O2 support, ween and titrate as needed  - Monitor for signs and symptoms of worsening infection, SOB, O2 desaturation  - Tylenol PRN for fever  - Admit to tele Pt symptomatic, febrile w/ leukocytosis and bandemia, tachycardia on admission w/ CXR findings--meets sepsis criteria  - S/p 2.4L NC, ceftriaxone, azithromycin in ED  - 11/27 CXR: Wet read increased opacities LLL and R lower lung  - 11/27 CT awaiting read  - Found to be COVID+, RVP negative otherwise  - Continue ceftriaxone and azithromycin for epimeric coverage  - F/u BCx x2 and UCx, MRSA PCR, Legionella/Strep antigen  - Satting well on 2L NC; Continue O2 support, ween and titrate as needed  - Monitor for signs and symptoms of worsening infection, SOB, O2 desaturation  - Tylenol PRN for fever  - Admit to tele Pt symptomatic, febrile w/ leukocytosis and bandemia, tachycardia on admission w/ CXR findings--meets sepsis criteria  - S/p 2.4L NC, ceftriaxone, azithromycin in ED  - 11/27 CXR: Wet read increased opacities LLL and R lower lung  - 11/27 CT awaiting read  - Found to be COVID+, RVP negative otherwise  - Continue ceftriaxone and azithromycin for epimeric coverage  - F/u BCx x2 and UCx, MRSA PCR, Legionella/Strep antigen  - Satting well on 2L NC; Continue O2 support, ween and titrate as needed  - Monitor for signs and symptoms of worsening infection, SOB, O2 desaturation  - Tylenol PRN for fever  - Pulmonology on board, recs appreciated  - Admit to tele

## 2022-11-27 NOTE — PHYSICAL THERAPY INITIAL EVALUATION ADULT - GAIT DISTANCE, PT EVAL
17 yo female with to [employer] earlier today and then noticed her left labia minora swollen, felt uncomfortable, swelling has increased over few hours, no pain, redness. No fever, dysuria or trauma reported. She did not have any new food. She is on BCP and has no menstrual period for the last 7 months, sexually active with no other protection. One partner, denies using drugs. She has mild yeast infection for the past 2 weeks, not treated and she is going to [place] with family tomorrow morning. 5ft x 2/5 feet

## 2022-11-27 NOTE — H&P ADULT - ATTENDING COMMENTS
85 y/o male with hx a fib on Eliquis and BPH who presents to the hospital c/o shortness of breath with purulent sputum, fever, leukocytosis, and lobar opacities on CXR admitted for the work up and management of CAP vs. COVID-19 vs. superimposed CAP on COVID-19    #Acute respiratory failure with hypoxia  #Sepsis  #Pneumonia  #COVID-19  #Afib  #BPH  #hematuria    -Patient presented with acute respiratory failure with hypoxia, currently saturating well on supplemental O2, wean as tolerated  -CT showing LLL consolidation also with peripheral groundglass opacities (personal reads)- possible superimposed CAP on COVID- continue IV abx for now. Start remdesivir and steroids for COVID-19. Pulma nd ID consulted, appreciate recs  -Patient and wife unsure about medication history, son is a physician, will go home and call me with full med list, for now, continue metoprolol (with hold parameters), tamsulosin, finasteride and eliquis  -Patient complaining of hematuria, urinal at bedside with yellow urine, however UA showing large blood and >50 RBCs. Dried blood also noted on patient's meatus and underwear. Discussed with patient's outpatient Urologist (Dr. Green)- states patient has very large prostate which could have been irritated, if patient able to urinate and no gross blood in urine, can continue eliquis and monitor H/H at this time    -Plan of care discussed with son and wife at bedside.

## 2022-11-27 NOTE — CONSULT NOTE ADULT - ASSESSMENT
86 m h/o a fib on Eliquis and BPH who presents to the hospital c/o shortness of breath. The day prior to admission patient notes he began to experience chest pain that was non-radiating as well as SOB.   hypoxia in ED 88%  covid 19 + superimposed bacterial pna on ct with left sided infiltrate  bandemia     plan  supportive oxygen  agree with remdesivir- trend lft  decadron day 1 of 10  ac - eliquis    agree with antibx   ceftriaxone and azithromycin  check urine legionella if neg stop azithromycin  trend biomarkers   86 m h/o a fib on Eliquis and BPH who presents to the hospital c/o shortness of breath. The day prior to admission patient notes he began to experience chest pain that was non-radiating as well as SOB.   hypoxia in ED 88%  covid 19 + superimposed bacterial pna on ct with left sided infiltrate  bandemia     plan  supportive oxygen  agree with remdesivir- trend lft  decadron day 1 of 10  ac - eliquis    agree with antibx   ceftriaxone and azithromycin  check urine legionella if neg stop azithromycin  trend biomarkers    family member at bedside is a physician questions answered

## 2022-11-27 NOTE — ED ADULT NURSE REASSESSMENT NOTE - NSIMPLEMENTINTERV_GEN_ALL_ED
Implemented All Fall with Harm Risk Interventions:  [place] to call system. Call bell, personal items and telephone within reach. Instruct patient to call for assistance. Room bathroom lighting operational. Non-slip footwear when patient is off stretcher. Physically safe environment: no spills, clutter or unnecessary equipment. Stretcher in lowest position, wheels locked, appropriate side rails in place. Provide visual cue, wrist band, yellow gown, etc. Monitor gait and stability. Monitor for mental status changes and reorient to person, place, and time. Review medications for side effects contributing to fall risk. Reinforce activity limits and safety measures with patient and family. Provide visual clues: red socks.

## 2022-11-27 NOTE — H&P ADULT - HISTORY OF PRESENT ILLNESS
IN THE ED:  Temp 101.8F, , /65, RR 18, 88 SpO2 on room air  Labs significant for: WBC 10.5 (neutrophil predominant) w/ 10% bands, Potassium 3.3, COVID+, UA: Large blood w/ >50 RBC.   Imaging  CXR:  EKG:  Received in ED:  Consults:  patient a/o x 4 with a calm affect c/o shortness of breath and fever.  patient denies chest pains. patient noticed to have blood in urine, wife states that patient has had urinary issues in the past.  EKG completed, patient placed on cardiac monitor.  patient tolerating nasal cannula well    IN THE ED:  Temp 101.8F, , /65, RR 18, 88 SpO2 on room air  Labs significant for: WBC 10.5 (neutrophil predominant) w/ 10% bands, Potassium 3.3, COVID+, UA: Large blood w/ >50 RBC, 30 protein.   Imaging  CXR: Wet read increased opacity in LLL and RLL  EKG:   Received in ED: Azithromycin IV, Ceftriaxone IV, NS 2.4L  Consults: Pulmonology Pt is an 87 y/o male with hx a fib on Eliquis and BPH who presents to the hospital c/o shortness of breath. The day prior to admission patient notes he began to experience chest pain that was non-radiating as well as SOB. Overnight the patient's wife reports that the patient tried to go to the bathroom and was off balance and experiencing "poor breathing." Pt endorses productive cough also x 1 day, brown/pink tinged. Patient and wife do not endorse any sick contacts. Associated symptoms include + chills, + productive cough, + "wheezing". Patient denies feeling febrile, CP, n/v, diarrhea.    IN THE ED:  Temp 101.8F, , /65, RR 18, 88 SpO2 on room air  Labs significant for: WBC 10.5 (neutrophil predominant) w/ 10% bands, Potassium 3.3, COVID+, UA: Large blood w/ >50 RBC, 30 protein.   Imaging  CXR: Wet read increased opacity in LLL and RLL  EKG: Afib, rate 107  Received in ED: Azithromycin IV, Ceftriaxone IV, NS 2.4L  Consults: Pulmonology

## 2022-11-27 NOTE — CONSULT NOTE ADULT - SUBJECTIVE AND OBJECTIVE BOX
Date/Time Patient Seen:  		  Referring MD:   Data Reviewed	       Patient is a 86y old  Male who presents with a chief complaint of     Subjective/HPI   · Chief Complaint: The patient is a 86y Male complaining of shortness of breath.  · HPI Objective Statement: Patient brought in by EMS for shortness of breath fever productive cough.  Patient denies headache chest pain abdominal pain nausea vomiting diarrhea dysuria.  pmd Marcos  · Presenting Symptoms: COUGH, FEVER, SHORTNESS OF BREATH  · Negative Findings: no chest pain, no headache  · Duration: today  · Quality: alteration in breathing pattern, productive cough  · Context: unknown  · Recent Exposure To: none known  · Aggravated Factors: none  · Relieving Factors: none    PAST MEDICAL & SURGICAL HISTORY:  Afib    BPH (benign prostatic hyperplasia)          Medication list         MEDICATIONS  (STANDING):  azithromycin  IVPB 500 milliGRAM(s) IV Intermittent once    MEDICATIONS  (PRN):         Vitals log        ICU Vital Signs Last 24 Hrs  T(C): 38.8 (27 Nov 2022 04:42), Max: 38.8 (27 Nov 2022 04:42)  T(F): 101.8 (27 Nov 2022 04:42), Max: 101.8 (27 Nov 2022 04:42)  HR: 101 (27 Nov 2022 04:42) (101 - 101)  BP: 132/65 (27 Nov 2022 04:42) (132/65 - 132/65)  BP(mean): --  ABP: --  ABP(mean): --  RR: 18 (27 Nov 2022 04:42) (18 - 18)  SpO2: 88% (27 Nov 2022 04:42) (88% - 88%)    O2 Parameters below as of 27 Nov 2022 04:42  Patient On (Oxygen Delivery Method): room air                 Input and Output:  I&O's Detail      Lab Data                        13.0   10.52 )-----------( 193      ( 27 Nov 2022 06:00 )             38.4                   Review of Systems	      Objective     Physical Examination        Pertinent Lab findings & Imaging      [place]:  NO   Adequate UO     I&O's Detail           Discussed with:     Cultures:	        Radiology      EXAM:  CT RENAL STONE HUNT                            PROCEDURE DATE:  03/31/2021          INTERPRETATION:  CLINICAL INFORMATION: Dysuria for 12 days    COMPARISON: None.    CONTRAST/COMPLICATIONS:  IV Contrast: NONE  0 cc administered   0 cc discarded  Oral Contrast: NONE  Complications: None reported at time of study completion    PROCEDURE:  CT of the Abdomen and Pelvis was performed.  Sagittal and coronal reformats were performed.    Note: The exam is limited because some types of pathology may not be adequately demonstrated due to lack of contrast enhancement.    FINDINGS:  LOWER CHEST: Calcified and noncalcified pleural plaques with areas of pleural parenchymal scarring..    LIVER: Unremarkable, unenhanced appearance  BILE DUCTS: Unremarkable, unenhanced appearance  GALLBLADDER: Unremarkable, unenhanced appearance  SPLEEN: Unremarkable, unenhanced appearance  PANCREAS: Unremarkable, unenhanced appearance  ADRENALS: Unremarkable, unenhanced appearance  KIDNEYS/URETERS: Bilateral nonobstructing nephrolithiasis. Borderline mild right hydroureter in the setting of partially duplicated renal collecting system.    BLADDER: Decompressed by [place] catheter  REPRODUCTIVE ORGANS: Prostatomegaly    BOWEL: Diverticulosis coli. No bowel obstruction. Normal appendix.  PERITONEUM: No free air or ascites  VESSELS: Atherosclerotic change of the abdominal aorta without aneurysm. Calcified plaque is present in the mesenteric and renal arteries.  RETROPERITONEUM/LYMPH NODES: No hemorrhage. No enlarged lymph node measuring greater than 10 mm in short axis  ABDOMINAL WALL: Tiny fat-containing periumbilical hernia. Small fat-containing right inguinal hernia, causes mass effect on adjacent small bowel.  BONES: Degenerative changes in the spine and bilateral hips    IMPRESSION:    1. 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 Correlation with urinalysis is advised to exclude ascending urinary tract infection.  2. Bilateral nonobstructing nephrolithiasis. No evidence of ureterolithiasis  3. Severe prostatomegaly.              JULIA PATEL MD; Attending Radiologist  This document has been electronically signed. Mar 31 2021 10:35PM                         Date/Time Patient Seen:  		  Referring MD:   Data Reviewed	       Patient is a 86y old  Male who presents with a chief complaint of     Subjective/HPI  in bed  seen and examined  vs noted  labs reviewed  imaging reviewed  sob  monterroso  weakness  fever  chills  on o2 support    retired  lives at home  asbestos exp     · Chief Complaint: The patient is a 86y Male complaining of shortness of breath.  · HPI Objective Statement: Patient brought in by EMS for shortness of breath fever productive cough.  Patient denies headache chest pain abdominal pain nausea vomiting diarrhea dysuria.  pmd Marcos  · Presenting Symptoms: COUGH, FEVER, SHORTNESS OF BREATH  · Negative Findings: no chest pain, no headache  · Duration: today  · Quality: alteration in breathing pattern, productive cough  · Context: unknown  · Recent Exposure To: none known  · Aggravated Factors: none  · Relieving Factors: none    PAST MEDICAL & SURGICAL HISTORY:  Afib    BPH (benign prostatic hyperplasia)          Medication list         MEDICATIONS  (STANDING):  azithromycin  IVPB 500 milliGRAM(s) IV Intermittent once    MEDICATIONS  (PRN):         Vitals log        ICU Vital Signs Last 24 Hrs  T(C): 38.8 (27 Nov 2022 04:42), Max: 38.8 (27 Nov 2022 04:42)  T(F): 101.8 (27 Nov 2022 04:42), Max: 101.8 (27 Nov 2022 04:42)  HR: 101 (27 Nov 2022 04:42) (101 - 101)  BP: 132/65 (27 Nov 2022 04:42) (132/65 - 132/65)  BP(mean): --  ABP: --  ABP(mean): --  RR: 18 (27 Nov 2022 04:42) (18 - 18)  SpO2: 88% (27 Nov 2022 04:42) (88% - 88%)    O2 Parameters below as of 27 Nov 2022 04:42  Patient On (Oxygen Delivery Method): room air                 Input and Output:  I&O's Detail      Lab Data                        13.0   10.52 )-----------( 193      ( 27 Nov 2022 06:00 )             38.4                   Review of Systems	  cough  fever  chills      Objective     Physical Examination    heart s1s2  lung dec BS  abd soft  head nc  verbal  alert  oriented  on o2 support      Pertinent Lab findings & Imaging      [place]:  NO   Adequate UO     I&O's Detail           Discussed with:     Cultures:	        Radiology      EXAM:  CT RENAL STONE HUNT                            PROCEDURE DATE:  03/31/2021          INTERPRETATION:  CLINICAL INFORMATION: Dysuria for 12 days    COMPARISON: None.    CONTRAST/COMPLICATIONS:  IV Contrast: NONE  0 cc administered   0 cc discarded  Oral Contrast: NONE  Complications: None reported at time of study completion    PROCEDURE:  CT of the Abdomen and Pelvis was performed.  Sagittal and coronal reformats were performed.    Note: The exam is limited because some types of pathology may not be adequately demonstrated due to lack of contrast enhancement.    FINDINGS:  LOWER CHEST: Calcified and noncalcified pleural plaques with areas of pleural parenchymal scarring..    LIVER: Unremarkable, unenhanced appearance  BILE DUCTS: Unremarkable, unenhanced appearance  GALLBLADDER: Unremarkable, unenhanced appearance  SPLEEN: Unremarkable, unenhanced appearance  PANCREAS: Unremarkable, unenhanced appearance  ADRENALS: Unremarkable, unenhanced appearance  KIDNEYS/URETERS: Bilateral nonobstructing nephrolithiasis. Borderline mild right hydroureter in the setting of partially duplicated renal collecting system.    BLADDER: Decompressed by [place] catheter  REPRODUCTIVE ORGANS: Prostatomegaly    BOWEL: Diverticulosis coli. No bowel obstruction. Normal appendix.  PERITONEUM: No free air or ascites  VESSELS: Atherosclerotic change of the abdominal aorta without aneurysm. Calcified plaque is present in the mesenteric and renal arteries.  RETROPERITONEUM/LYMPH NODES: No hemorrhage. No enlarged lymph node measuring greater than 10 mm in short axis  ABDOMINAL WALL: Tiny fat-containing periumbilical hernia. Small fat-containing right inguinal hernia, causes mass effect on adjacent small bowel.  BONES: Degenerative changes in the spine and bilateral hips    IMPRESSION:    1. 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 Correlation with urinalysis is advised to exclude ascending urinary tract infection.  2. Bilateral nonobstructing nephrolithiasis. No evidence of ureterolithiasis  3. Severe prostatomegaly.              JULIA PATEL MD; Attending Radiologist  This document has been electronically signed. Mar 31 2021 10:35PM

## 2022-11-27 NOTE — H&P ADULT - PROBLEM SELECTOR PLAN 4
Patient with chronic asymptomatic atrial fibrillation,  - Rate Control: None  - Anticoagulation: On Eliquis  - Monitor and replete electrolytes   - K 3.3 on admission, 40mEq x2 K ordered Patient with chronic asymptomatic atrial fibrillation  - Rate Control: None  - Anticoagulation: On Eliquis  - Monitor and replete electrolytes   - K 3.3 on admission, 40mEq x2 K ordered Patient with chronic asymptomatic atrial fibrillation  - Rate Control: Metoprolol succinate ER 25mg BID (pending med rec)  - Anticoagulation: On Eliquis 5mg BID (pending med rec)  - Monitor and replete electrolytes   - K 3.3 on admission, 40mEq x2 K ordered Patient with chronic asymptomatic atrial fibrillation  - Rate Control: Metoprolol succinate ER 25mg BID  - Anticoagulation: On Eliquis 5mg BID  - Monitor and replete electrolytes   - K 3.3 on admission, 40mEq x2 K ordered

## 2022-11-27 NOTE — H&P ADULT - PROBLEM SELECTOR PLAN 6
On Eliquis, additional DVT prophylaxis not needed at this time    Dispo  -PT consult. -Pt and family do not know what medications pt has taken  -Possibly on: Tamsulosin 0.4mg OCT 25 2022 qD, Dutesteride 0.5mg July 27th qD, Metoprolol succinate ER BID 25mg June 2nd 2022: Elliquis 5mg BID May 2022.  -Family states [facility] uses [facility] and no medications have changed since last visit, however this is not true as [facility] has had interem medications picked up since last visit to ED  -[facility], left callback # for med rec  -No records on [facility]scripts (outpt)  -Called pharmacy for med rec  -Follow up meds -Pt and family do not know what medications pt has taken  -Possibly on: Tamsulosin 0.4mg OCT 25 2022 qD, Dutesteride 0.5mg July 27th qD, Metoprolol succinate ER BID 25mg June 2nd 2022: Elliquis 5mg BID May 2022.  -Family states [facility] and no medications have changed since last visit. This is not true as [facility]) has had interem medications picked up since last visit to ED  -[facility], left callback # for med rec  -No records on [facility] (outpt)  -Family instructed to bring in meds, may have been lost  -Called pharmacy for med rec  -Follow up med rec -Pt and family do not know what medications pt has taken  -Possibly on: Tamsulosin 0.4mg OCT 25 2022 qD, Dutesteride 0.5mg July 27th qD, Metoprolol succinate ER BID 25mg June 2nd 2022: Elliquis 5mg BID May 2022.  -[facility] states [facility] and no medications have changed since last visit. This is not true as [facility] has had interem medications picked up since last visit to ED  -[facility], left callback # for med rec  -No records on [facility]scripts (outpt)  -Family instructed to bring in meds, may have been lost during transport on the way to the hospital  -Called pharmacy for med rec  -Follow up med rec

## 2022-11-27 NOTE — H&P ADULT - ASSESSMENT
87 y/o male with hx a fib on Eliquis and BPH who presents to the hospital c/o shortness of breath with purulent sputum, fever, leukocytosis, and lobar opacities on CXR admitted for the work up and management of CAP vs. COVID-19 vs. superimposed CAP on COVID-19

## 2022-11-27 NOTE — PATIENT PROFILE ADULT - FALL HARM RISK - RISK INTERVENTIONS
Assistance OOB with selected safe patient handling equipment/Assistance with ambulation/Communicate Fall Risk and Risk Factors to all staff, patient, and family/Discuss with provider need for PT consult/Monitor gait and stability/Reinforce activity limits and safety measures with patient and family/Visual Cue: Yellow wristband/Bed in lowest position, wheels locked, appropriate side rails in place/Call bell, personal items and telephone in reach/Instruct patient to call for assistance before getting out of bed or chair/Non-slip footwear when patient is out of bed/[place] to call system/Physically safe environment - no spills, clutter or unnecessary equipment/Purposeful Proactive Rounding/Room/bathroom lighting operational, light cord in reach

## 2022-11-27 NOTE — H&P ADULT - NSHPPHYSICALEXAM_GEN_ALL_CORE
VITALS:  Vital Signs Last 24 Hrs  T(C): 36.8 (27 Nov 2022 08:19), Max: 38.8 (27 Nov 2022 04:42)  T(F): 98.3 (27 Nov 2022 08:19), Max: 101.8 (27 Nov 2022 04:42)  HR: 93 (27 Nov 2022 08:19) (90 - 101)  BP: 116/76 (27 Nov 2022 08:19) (116/76 - 132/65)  BP(mean): --  RR: 17 (27 Nov 2022 08:19) (17 - 18)  SpO2: 95% (27 Nov 2022 08:19) (88% - 95%)    Parameters below as of 27 Nov 2022 08:19  Patient On (Oxygen Delivery Method): nasal cannula  O2 Flow (L/min): 3      PHYSICAL EXAM:    GENERAL: patient appears in no acute distress, appropriately interactive  EYES: EOMI b/l, sclera clear, no exudates  HEENT: NC/AT  LUNGS: mild ronchi - good air entry b/l, symmetric breath sounds, no wheezing appreciated  HEART: Irregularly irregular. +S1/S2, no murmurs, rubs, or gallops  GASTROINTESTINAL: abdomen is soft, NTND, no palpable masses  INTEGUMENT: good skin turgor, appears well perfused, no visualized rashes, no jaundice noted  EXTREMITIES: no clubbing or cyanosis, no obvious deformity, 2+ peripheral pulses  NEUROLOGIC: AA&O x3, motor strength grossly intact, no obvious sensory deficits  PSYCHIATRIC: mood is good, affect is congruent with mood, linear and logical thought process  HEME/LYMPH: no obvious ecchymosis VITALS:  Vital Signs Last 24 Hrs  T(C): 36.8 (27 Nov 2022 08:19), Max: 38.8 (27 Nov 2022 04:42)  T(F): 98.3 (27 Nov 2022 08:19), Max: 101.8 (27 Nov 2022 04:42)  HR: 93 (27 Nov 2022 08:19) (90 - 101)  BP: 116/76 (27 Nov 2022 08:19) (116/76 - 132/65)  BP(mean): --  RR: 17 (27 Nov 2022 08:19) (17 - 18)  SpO2: 95% (27 Nov 2022 08:19) (88% - 95%)    Parameters below as of 27 Nov 2022 08:19  Patient On (Oxygen Delivery Method): nasal cannula  O2 Flow (L/min): 3      PHYSICAL EXAM:    GENERAL: patient appears in no acute distress, appropriately interactive  EYES: EOMI b/l, sclera clear, no exudates  HEENT: NC/AT  LUNGS: mild ronchi - good air entry b/l, symmetric breath sounds, no wheezing appreciated  HEART: Irregularly irregular. +S1/S2, no murmurs, rubs, or gallops  GASTROINTESTINAL: abdomen is soft, NTND, no palpable masses  INTEGUMENT: good skin turgor, appears well perfused, no visualized rashes, no jaundice noted  EXTREMITIES: no clubbing or cyanosis, no obvious deformity, 2+ peripheral pulses  NEUROLOGIC: AA&O x3, motor strength grossly intact, no obvious sensory deficits  PSYCHIATRIC: mood is good, affect is congruent with mood, linear and logical thought process  HEME/LYMPH: no obvious ecchymosis  : No gross hematuria

## 2022-11-27 NOTE — ED PROVIDER NOTE - OBJECTIVE STATEMENT
Patient brought in by EMS for shortness of breath fever productive cough.  Patient denies headache chest pain abdominal pain nausea vomiting diarrhea dysuria.  pmsamantha Rodríguez

## 2022-11-27 NOTE — CONSULT NOTE ADULT - SUBJECTIVE AND OBJECTIVE BOX
[facility], Division of Infectious Diseases  MAURA Resendez S. Shah, Y. Patel, G[facility]   [phone]  after hours and weekends [phone]    MAK, JAN 86y, Male  [id number]    HPI--  86 m h/o a fib on Eliquis and BPH who presents to the hospital c/o shortness of breath. The day prior to admission patient notes he began to experience chest pain that was non-radiating as well as SOB.   Overnight reports that the patient tried to go to the bathroom and was off balance and experiencing "poor breathing.". Associated symptoms include + chills, + productive cough, + "wheezing". Patient denies feeling febrile, CP, n/v, diarrhea.  states he is vaccinated for covid 19  has not had covid before  no recent antibx   on admission pulse ox 88%           PMH/PSH--  Afib  BPH (benign prostatic hyperplasia)  History of hypotension        Allergies--nKDA      Medications--  Antibiotics: azithromycin  IVPB 500 milliGRAM(s) IV Intermittent every 24 hours  remdesivir  IVPB   IV Intermittent     Immunologic:   Other: acetaminophen     Tablet .. PRN  aluminum hydroxide/magnesium hydroxide/simethicone Suspension PRN  apixaban  dexAMETHasone     Tablet  finasteride  guaiFENesin Oral Liquid (Sugar-Free) PRN  melatonin PRN  metoprolol succinate ER  ondansetron Injectable PRN  polyethylene glycol 3350  tamsulosin      Social History--  EtOH: denies ***  Tobacco: FORMER  Drug Use: denies ***    Family/Marital History--  FH: cancer (Mother)          Travel/Environmental/Occupational History:  [place]    Review of Systems:  REVIEW OF SYSTEMS  General: no fever, + chills, no wt loss	  Ophthalmologic: no blurry vision  Respiratory and Thorax: + cough, + dyspnea  Cardiovascular: + chest pain, no palpitations  Gastrointestinal:  no nausea, no vomiting, diarrhea  Genitourinary: no dysuria, no urgency, no frequency	  Musculoskeletal: no myalgias	  Neurological:  no headache	    Physical Exam--  Vital Signs: T(F): 98.3 (11-27-22 @ 08:19), Max: 101.8 (11-27-22 @ 04:42)  HR: 93 (11-27-22 @ 08:19)  BP: 116/76 (11-27-22 @ 08:19)  RR: 17 (11-27-22 @ 08:19)  SpO2: 95% (11-27-22 @ 08:19)  Wt(kg): --  General: Nontoxic-appearing Male in no acute distress.  HEENT: AT/NC. P  Neck: Not rigid. No sense of mass.  Nodes: None palpable.  Lungs: Clear bilaterally without rales, wheezing or rhonchi  Heart: IRREG S1S2  Abdomen: Bowel sounds present and normoactive. Soft. Nondistended. Nontender.  Extremities: No cyanosis or clubbing. No edema.   Skin: Warm. Dry. Good turgor. No rash. No vasculitic stigmata.  Psychiatric: Appropriate affect and mood for situation.         Laboratory & Imaging Data--  CBC                        13.0   10.52 )-----------( 193      ( 27 Nov 2022 06:00 )             38.4       Chemistries  11-27    141  |  108  |  9   ----------------------------<  110<H>  3.3<L>   |  28  |  0.83    Ca    8.2<L>      27 Nov 2022 06:00    TPro  6.5  /  Alb  3.0<L>  /  TBili  1.0  /  DBili  x   /  AST  35  /  ALT  37  /  AlkPhos  87  11-27      Culture Data      < from: CT Chest No Cont (11.27.22 @ 07:42) >  study. Chronic deformity of the distal right clavicle.    IMPRESSION:    Multifocal pneumonia. Follow-up to resolution with repeat chest CT in one   month, or sooner as clinically warranted.    Bilateral calcified and noncalcified pleural plaques suggestive of prior   asbestos exposure.    Multivessel coronary artery calcification and aortic valve calcification.   Ectasia/borderline distention of the aortic root at the sinuses of   Valsalva measuring 4 cm. Correlate with echocardiogram.    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 Nonspecific nodular density along the caudal aspect of the   lipoma image 32 series 2, of unclear significance. Correlate with any   prior available imaging to evaluate for stability over time.    --- End of Report ---        < end of copied text >

## 2022-11-28 LAB
A1C WITH ESTIMATED AVERAGE GLUCOSE RESULT: 5.4 % — SIGNIFICANT CHANGE UP (ref 4–5.6)
BASOPHILS # BLD AUTO: 0.01 K/UL — SIGNIFICANT CHANGE UP (ref 0–0.2)
BASOPHILS NFR BLD AUTO: 0.1 % — SIGNIFICANT CHANGE UP (ref 0–2)
CHOLEST SERPL-MCNC: 207 MG/DL — HIGH
CULTURE RESULTS: SIGNIFICANT CHANGE UP
EOSINOPHIL # BLD AUTO: 0 K/UL — SIGNIFICANT CHANGE UP (ref 0–0.5)
EOSINOPHIL NFR BLD AUTO: 0 % — SIGNIFICANT CHANGE UP (ref 0–6)
ESTIMATED AVERAGE GLUCOSE: 108 MG/DL — SIGNIFICANT CHANGE UP (ref 68–114)
HCT VFR BLD CALC: 38.5 % — LOW (ref 39–50)
HDLC SERPL-MCNC: 50 MG/DL — SIGNIFICANT CHANGE UP
HGB BLD-MCNC: 12.8 G/DL — LOW (ref 13–17)
IMM GRANULOCYTES NFR BLD AUTO: 0.6 % — SIGNIFICANT CHANGE UP (ref 0–0.9)
LEGIONELLA AG UR QL: NEGATIVE — SIGNIFICANT CHANGE UP
LIPID PNL WITH DIRECT LDL SERPL: 150 MG/DL — HIGH
LYMPHOCYTES # BLD AUTO: 0.47 K/UL — LOW (ref 1–3.3)
LYMPHOCYTES # BLD AUTO: 3.7 % — LOW (ref 13–44)
MCHC RBC-ENTMCNC: 31.5 PG — SIGNIFICANT CHANGE UP (ref 27–34)
MCHC RBC-ENTMCNC: 33.2 GM/DL — SIGNIFICANT CHANGE UP (ref 32–36)
MCV RBC AUTO: 94.8 FL — SIGNIFICANT CHANGE UP (ref 80–100)
MONOCYTES # BLD AUTO: 0.31 K/UL — SIGNIFICANT CHANGE UP (ref 0–0.9)
MONOCYTES NFR BLD AUTO: 2.5 % — SIGNIFICANT CHANGE UP (ref 2–14)
NEUTROPHILS # BLD AUTO: 11.79 K/UL — HIGH (ref 1.8–7.4)
NEUTROPHILS NFR BLD AUTO: 93.1 % — HIGH (ref 43–77)
NON HDL CHOLESTEROL: 157 MG/DL — HIGH
NRBC # BLD: 0 /100 WBCS — SIGNIFICANT CHANGE UP (ref 0–0)
PLATELET # BLD AUTO: 167 K/UL — SIGNIFICANT CHANGE UP (ref 150–400)
RBC # BLD: 4.06 M/UL — LOW (ref 4.2–5.8)
RBC # FLD: 13.4 % — SIGNIFICANT CHANGE UP (ref 10.3–14.5)
SPECIMEN SOURCE: SIGNIFICANT CHANGE UP
TRIGL SERPL-MCNC: 34 MG/DL — SIGNIFICANT CHANGE UP
WBC # BLD: 12.65 K/UL — HIGH (ref 3.8–10.5)
WBC # FLD AUTO: 12.65 K/UL — HIGH (ref 3.8–10.5)

## 2022-11-28 PROCEDURE — 99233 SBSQ HOSP IP/OBS HIGH 50: CPT

## 2022-11-28 RX ORDER — ATORVASTATIN CALCIUM 80 MG/1
20 TABLET, FILM COATED ORAL AT BEDTIME
Refills: 0 | Status: DISCONTINUED | OUTPATIENT
Start: 2022-11-28 | End: 2022-12-01

## 2022-11-28 RX ORDER — PANTOPRAZOLE SODIUM 20 MG/1
40 TABLET, DELAYED RELEASE ORAL DAILY
Refills: 0 | Status: DISCONTINUED | OUTPATIENT
Start: 2022-11-28 | End: 2022-12-01

## 2022-11-28 RX ORDER — ALBUTEROL 90 UG/1
2 AEROSOL, METERED ORAL EVERY 6 HOURS
Refills: 0 | Status: DISCONTINUED | OUTPATIENT
Start: 2022-11-28 | End: 2022-12-01

## 2022-11-28 RX ADMIN — APIXABAN 5 MILLIGRAM(S): 2.5 TABLET, FILM COATED ORAL at 05:11

## 2022-11-28 RX ADMIN — POLYETHYLENE GLYCOL 3350 17 GRAM(S): 17 POWDER, FOR SOLUTION ORAL at 11:41

## 2022-11-28 RX ADMIN — Medication 25 MILLIGRAM(S): at 05:12

## 2022-11-28 RX ADMIN — FINASTERIDE 5 MILLIGRAM(S): 5 TABLET, FILM COATED ORAL at 11:41

## 2022-11-28 RX ADMIN — ATORVASTATIN CALCIUM 20 MILLIGRAM(S): 80 TABLET, FILM COATED ORAL at 21:23

## 2022-11-28 RX ADMIN — AZITHROMYCIN 255 MILLIGRAM(S): 500 TABLET, FILM COATED ORAL at 06:24

## 2022-11-28 RX ADMIN — CEFTRIAXONE 100 MILLIGRAM(S): 500 INJECTION, POWDER, FOR SOLUTION INTRAMUSCULAR; INTRAVENOUS at 05:10

## 2022-11-28 RX ADMIN — Medication 6 MILLIGRAM(S): at 05:11

## 2022-11-28 RX ADMIN — APIXABAN 5 MILLIGRAM(S): 2.5 TABLET, FILM COATED ORAL at 19:05

## 2022-11-28 RX ADMIN — TAMSULOSIN HYDROCHLORIDE 0.4 MILLIGRAM(S): 0.4 CAPSULE ORAL at 21:23

## 2022-11-28 RX ADMIN — REMDESIVIR 500 MILLIGRAM(S): 5 INJECTION INTRAVENOUS at 10:32

## 2022-11-28 RX ADMIN — Medication 25 MILLIGRAM(S): at 19:05

## 2022-11-28 NOTE — PROGRESS NOTE ADULT - ASSESSMENT
86y Male complaining of shortness of breath - fever - chills - dyspnea - cough -     BPH  AF  eval for Lower resp tract infection  hx of Asbestos exposure  Hematuria  OA  COVID with HYPOXEMIA      vs noted - labs reviewed  ct chest reviewed  on emp ABX for poss CAP superimposed on COVID  febrile overnight  ID eval noted    D dimer  DVT p - pt is on ELIQUIS  [facility]vs rx regimen and BP control  Remdesivir and Decadron for COVID with Hypoxemia  monitor VS and HD  keep sat > 88 pct  ACAP and Robitussin PRN for sx management  isolation precs  nutrition  old records reviewed  spoke with pt - wife -

## 2022-11-28 NOTE — SWALLOW BEDSIDE ASSESSMENT ADULT - ASR SWALLOW RECOMMEND DIAG
Not recommended at this time due to overall functional pharyngeal swallow during bedside eval this date

## 2022-11-28 NOTE — PROGRESS NOTE ADULT - SUBJECTIVE AND OBJECTIVE BOX
Infection due to severe acute respiratory syndrome coronavirus 2 (SARS-CoV-2)    HPI:  Pt is an 85 y/o male with hx a fib on Eliquis and BPH who presents to the hospital c/o shortness of breath. The day prior to admission patient notes he began to experience chest pain that was non-radiating as well as SOB. Overnight the patient's wife reports that the patient tried to go to the bathroom and was off balance and experiencing "poor breathing." Pt endorses productive cough also x 1 day, brown/pink tinged. Patient and wife do not endorse any sick contacts. Associated symptoms include + chills, + productive cough, + "wheezing". Patient denies feeling febrile, CP, n/v, diarrhea.    IN THE ED:  Temp 101.8F, , /65, RR 18, 88 SpO2 on room air  Labs significant for: WBC 10.5 (neutrophil predominant) w/ 10% bands, Potassium 3.3, COVID+, UA: Large blood w/ >50 RBC, 30 protein.   Imaging  CXR: Wet read increased opacity in LLL and RLL  EKG: Afib, rate 107  Received in ED: Azithromycin IV, Ceftriaxone IV, NS 2.4L  Consults: Pulmonology (2022 07:12)    Interval History:  Patient was seen and examined at bedside around 11:15 am.  Breathing is improving.   Denies chest pain, palpitations, headache, dizziness, visual symptoms, nausea, vomiting or abdominal pain.  Hematuria improving. Denies any urinary symptoms.     ROS:  As per interval history otherwise unremarkable.    PHYSICAL EXAM:  Vital Signs   T(C): 37 (2022 13:47), Max: 37.6 (2022 05:17)  T(F): 98.6 (2022 13:47), Max: 99.7 (2022 05:17)  HR: 83 (:47) (69 - 87)  BP: 119/69 (2022 13:47) (115/57 - 131/69)  RR: 20 (:47) (20 - 22)  SpO2: 94% (:47) (92% - 96%)  Parameters below as of 2022 13:47  Patient On (Oxygen Delivery Method): nasal cannula  O2 Flow (L/min): 3  General: Elderly male sitting in bed comfortably. No acute distress  HEENT: EOMI. Clear conjunctivae. Moist mucus membrane  Neck: Supple.   Chest: Good air entry. No wheezing, rales or rhonchi. No chest wall tenderness.  Heart: Normal S1 & S2. RRR.   Abdomen: Non distended. Soft. Non-tender. + BS  Ext: No pedal edema. No calf tenderness   Neuro: AAO x 3. No focal deficit. No speech disorder  Skin: Warm and Dry  Psychiatry: Normal mood and affect    I&O's Summary    2022 07:01  -  2022 07:00  --------------------------------------------------------  IN: 0 mL / OUT: 550 mL / NET: -550 mL    LABS:                        12.8   12.65 )-----------( 167      ( 2022 07:30 )             38.5     11    141  |  108  |  9   ----------------------------<  110<H>  3.3<L>   |  28  |  0.83    Ca    8.2<L>      2022 06:00    TPro  6.5  /  Alb  3.0<L>  /  TBili  1.0  /  DBili  x   /  AST  35  /  ALT  37  /  AlkPhos  87      PT/INR - ( 2022 06:00 )   PT: 14.3 sec;   INR: 1.22 ratio       PTT - ( 2022 06:00 )  PTT:34.6 sec  Urinalysis Basic - ( 2022 06:00 )    Color: Yellow / Appearance: Slightly Turbid / S.015 / pH: x  Gluc: x / Ketone: Negative  / Bili: Negative / Urobili: Negative   Blood: x / Protein: 30 mg/dL / Nitrite: Negative   Leuk Esterase: Negative / RBC: >50 /HPF / WBC 3-5   Sq Epi: x / Non Sq Epi: Occasional / Bacteria: x    RADIOLOGY & ADDITIONAL STUDIES:  Reviewed     MEDICATIONS  (STANDING):  apixaban 5 milliGRAM(s) Oral two times a day  azithromycin  IVPB 500 milliGRAM(s) IV Intermittent every 24 hours  cefTRIAXone   IVPB 1000 milliGRAM(s) IV Intermittent every 24 hours  dexAMETHasone     Tablet 6 milliGRAM(s) Oral daily  finasteride 5 milliGRAM(s) Oral daily  metoprolol succinate ER 25 milliGRAM(s) Oral every 12 hours  polyethylene glycol 3350 17 Gram(s) Oral daily  remdesivir  IVPB   IV Intermittent   remdesivir  IVPB 100 milliGRAM(s) IV Intermittent every 24 hours  tamsulosin 0.4 milliGRAM(s) Oral at bedtime    MEDICATIONS  (PRN):  acetaminophen     Tablet .. 650 milliGRAM(s) Oral every 6 hours PRN Temp greater or equal to 38C (100.4F), Mild Pain (1 - 3)  aluminum hydroxide/magnesium hydroxide/simethicone Suspension 30 milliLiter(s) Oral every 4 hours PRN Dyspepsia  guaiFENesin Oral Liquid (Sugar-Free) 200 milliGRAM(s) Oral every 6 hours PRN Cough  melatonin 3 milliGRAM(s) Oral at bedtime PRN Insomnia  ondansetron Injectable 4 milliGRAM(s) IV Push every 8 hours PRN Nausea and/or Vomiting

## 2022-11-28 NOTE — SWALLOW BEDSIDE ASSESSMENT ADULT - COMMENTS
Per H&P - 87 y/o male with hx a fib on Eliquis and BPH who presents to the hospital c/o shortness of breath with purulent sputum, fever, leukocytosis, and lobar opacities on CXR admitted for the work up and management of CAP vs. COVID-19 vs. superimposed CAP on COVID-19    CT chest indicative of Multifocal pneumonia.  WBC elevated    Orders received and chart reviewed. The patient is AAOx3, follows commands and is cooperative for evaluation. He  denies previous or current difficulty swallowing and reports he prefers soft solids.

## 2022-11-28 NOTE — PROGRESS NOTE ADULT - ASSESSMENT
87 y/o male with hx a fib on Eliquis and BPH who presents to the hospital c/o shortness of breath with purulent sputum, fever, leukocytosis, and lobar opacities on CXR admitted for the work up and management of CAP vs. COVID-19 vs. superimposed CAP on COVID-19    1) Pneumonia.   Pt symptomatic, febrile w/ leukocytosis and bandemia, tachycardia on admission w/ CXR findings--meets sepsis criteria  - S/p 2.4L NC, ceftriaxone, azithromycin in ED  - 11/27 CXR: There is an irregular left mid lateral infiltrate and possibly a right base infiltrate.  - 11/27 CT with multifocal pneumonia   - Found to be COVID+, RVP negative otherwise  - Continue ceftriaxone and azithromycin for suspected superimposed bacterial pneumonia   - F/u BCx x2, MRSA PCR, Legionella/Strep antigen  - Satting well on 2L NC; Continue O2 support, ween and titrate as needed  - Monitor for signs and symptoms of worsening infection, SOB, O2 desaturation  - Tylenol PRN for fever  - Pulmonology on board, recs appreciated    2) COVID-19.   Pt meeting sepsis criteria on admission  - Satting well on 2L NC  - CXR as above  - 1 day since symptom onset   - maintain isolation precautions - airborne and contact  - monitor for signs and symptoms of worsening infection, SOB, O2 desaturation  - Continue Remdesivir and Dexamethasone    - ID consult Dr. Barraza appreciated     3) Hematuria.   W/ gross hematuria prior to presentation, UA positive for gross blood, >50RBC, 30 protein  No gross hematuria observed on exam- dried blood noted on meatus  - On Eliquis for afib  - Previously followed Dr. Green (urology)  - Patient W/ history of repeated UTI, prostatitis, urinary retention requiring [place] cath  - Discussed with urology, patient with very large prostate- likely was irritated, if patient not having difficulty urinating, and no deepika blood in urine (urine clear in urinal at bedside), can continue eliquis and monitor H/H.  - Improving     4) Afib.   Patient with chronic asymptomatic atrial fibrillation  - Rate Control: Metoprolol succinate ER 25mg BID  - Anticoagulation: On Eliquis 5mg BID  - Monitor and replete electrolytes   - K 3.3 on admission, repleted     5) BPH (benign prostatic hyperplasia).   - Previously followed Dr. Green  - W/ history of repeated UTI, prostatitis, urinary retention requiring [place] cath  - Continue home tamsulosin w/ hold parameters, and Dutasteride therapeutic interchange.    6) HLD  - Start Lipitor     DVT prophylaxis -- Patient is on Eliquis    Dispo: Likely home once stable.    Updated patient's wife at bedside.

## 2022-11-28 NOTE — PROGRESS NOTE ADULT - SUBJECTIVE AND OBJECTIVE BOX
[facility] DIVISION of INFECTIOUS DISEASE  Umair Barraza MD PhD, Sandra Hall MD, Mary Malcolm MD, Breonna Hannon MD, Steve Guy MD  and providing coverage with Gerard Arrington MD  Providing Infectious Disease Consultations at [facility], [facility], [place], [facility], [facility]'s    Office# [phone] to schedule follow up appointments  Answering Service for urgent calls or New Consults [phone]  Cell# to text for urgent issues Umair Barraza [phone]     infectious diseases progress note:    ZENAIDA CORADO is a 86y y. o. Male patient    Overnight and events of the last 24hrs reviewed    Allergies    No Known Allergies    Intolerances        ANTIBIOTICS/RELEVANT:  antimicrobials  azithromycin  IVPB 500 milliGRAM(s) IV Intermittent every 24 hours  cefTRIAXone   IVPB 1000 milliGRAM(s) IV Intermittent every 24 hours  remdesivir  IVPB   IV Intermittent   remdesivir  IVPB 100 milliGRAM(s) IV Intermittent every 24 hours    immunologic:    OTHER:  acetaminophen     Tablet .. 650 milliGRAM(s) Oral every 6 hours PRN  aluminum hydroxide/magnesium hydroxide/simethicone Suspension 30 milliLiter(s) Oral every 4 hours PRN  apixaban 5 milliGRAM(s) Oral two times a day  dexAMETHasone     Tablet 6 milliGRAM(s) Oral daily  finasteride 5 milliGRAM(s) Oral daily  guaiFENesin Oral Liquid (Sugar-Free) 200 milliGRAM(s) Oral every 6 hours PRN  melatonin 3 milliGRAM(s) Oral at bedtime PRN  metoprolol succinate ER 25 milliGRAM(s) Oral every 12 hours  ondansetron Injectable 4 milliGRAM(s) IV Push every 8 hours PRN  polyethylene glycol 3350 17 Gram(s) Oral daily  tamsulosin 0.4 milliGRAM(s) Oral at bedtime      Objective:  Vital Signs Last 24 Hrs  T(C): 37 (2022 13:47), Max: 38.8 (2022 17:16)  T(F): 98.6 (2022 13:47), Max: 101.9 (2022 17:16)  HR: 83 (2022 13:47) (69 - 99)  BP: 119/69 (2022 13:47) (115/57 - 141/64)  BP(mean): --  RR: 20 (2022 13:47) (16 - 22)  SpO2: 94% (2022 13:47) (92% - 97%)    Parameters below as of 2022 13:47  Patient On (Oxygen Delivery Method): nasal cannula  O2 Flow (L/min): 3      T(C): 37 (22 @ 13:47), Max: 38.8 (22 @ 04:42)  T(C): 37 (22 @ 13:47), Max: 38.8 (22 @ 04:42)  T(C): 37 (22 @ 13:47), Max: 38.8 (22 @ 04:42)    PHYSICAL EXAM:  HEENT: NC atraumatic  Neck: supple  Respiratory: no accessory muscle use, breathing comfortably  Cardiovascular: distant  Gastrointestinal: normal appearing, nondistended  Extremities: no clubbing, no cyanosis,        LABS:                          12.8   12.65 )-----------( 167      ( 2022 07:30 )             38.5       WBC  12.65  @ 07:30  10.52  @ 06:00          141  |  108  |  9   ----------------------------<  110<H>  3.3<L>   |  28  |  0.83    Ca    8.2<L>      2022 06:00    TPro  6.5  /  Alb  3.0<L>  /  TBili  1.0  /  DBili  x   /  AST  35  /  ALT  37  /  AlkPhos  87        Creatinine, Serum: 0.83 mg/dL (22 @ 06:00)      PT/INR - ( 2022 06:00 )   PT: 14.3 sec;   INR: 1.22 ratio         PTT - ( 2022 06:00 )  PTT:34.6 sec  Urinalysis Basic - ( 2022 06:00 )    Color: Yellow / Appearance: Slightly Turbid / S.015 / pH: x  Gluc: x / Ketone: Negative  / Bili: Negative / Urobili: Negative   Blood: x / Protein: 30 mg/dL / Nitrite: Negative   Leuk Esterase: Negative / RBC: >50 /HPF / WBC 3-5   Sq Epi: x / Non Sq Epi: Occasional / Bacteria: x            INFLAMMATORY MARKERS      MICROBIOLOGY:              RADIOLOGY & ADDITIONAL STUDIES:

## 2022-11-28 NOTE — PROGRESS NOTE ADULT - SUBJECTIVE AND OBJECTIVE BOX
Date/Time Patient Seen:  		  Referring MD:   Data Reviewed	       Patient is a 86y old  Male who presents with a chief complaint of     Subjective/HPI     PAST MEDICAL & SURGICAL HISTORY:  Afib    BPH (benign prostatic hyperplasia)    History of hypotension          Medication list         MEDICATIONS  (STANDING):  apixaban 5 milliGRAM(s) Oral two times a day  azithromycin  IVPB 500 milliGRAM(s) IV Intermittent every 24 hours  cefTRIAXone   IVPB 1000 milliGRAM(s) IV Intermittent every 24 hours  dexAMETHasone     Tablet 6 milliGRAM(s) Oral daily  finasteride 5 milliGRAM(s) Oral daily  metoprolol succinate ER 25 milliGRAM(s) Oral every 12 hours  polyethylene glycol 3350 17 Gram(s) Oral daily  remdesivir  IVPB   IV Intermittent   remdesivir  IVPB 100 milliGRAM(s) IV Intermittent every 24 hours  tamsulosin 0.4 milliGRAM(s) Oral at bedtime    MEDICATIONS  (PRN):  acetaminophen     Tablet .. 650 milliGRAM(s) Oral every 6 hours PRN Temp greater or equal to 38C (100.4F), Mild Pain (1 - 3)  aluminum hydroxide/magnesium hydroxide/simethicone Suspension 30 milliLiter(s) Oral every 4 hours PRN Dyspepsia  guaiFENesin Oral Liquid (Sugar-Free) 200 milliGRAM(s) Oral every 6 hours PRN Cough  melatonin 3 milliGRAM(s) Oral at bedtime PRN Insomnia  ondansetron Injectable 4 milliGRAM(s) IV Push every 8 hours PRN Nausea and/or Vomiting         Vitals log        ICU Vital Signs Last 24 Hrs  T(C): 37.6 (28 Nov 2022 05:17), Max: 38.8 (27 Nov 2022 17:16)  T(F): 99.7 (28 Nov 2022 05:17), Max: 101.9 (27 Nov 2022 17:16)  HR: 87 (28 Nov 2022 05:08) (69 - 99)  BP: 116/52 (28 Nov 2022 05:08) (115/57 - 141/64)  BP(mean): --  ABP: --  ABP(mean): --  RR: 20 (28 Nov 2022 05:17) (16 - 22)  SpO2: 92% (28 Nov 2022 05:17) (92% - 97%)    O2 Parameters below as of 28 Nov 2022 05:17  Patient On (Oxygen Delivery Method): nasal cannula  O2 Flow (L/min): 3               Input and Output:  I&O's Detail      Lab Data                        12.5   x     )-----------( x        ( 27 Nov 2022 16:00 )             37.2     11-27    141  |  108  |  9   ----------------------------<  110<H>  3.3<L>   |  28  |  0.83    Ca    8.2<L>      27 Nov 2022 06:00    TPro  6.5  /  Alb  3.0<L>  /  TBili  1.0  /  DBili  x   /  AST  35  /  ALT  37  /  AlkPhos  87  11-27            Review of Systems	      Objective     Physical Examination    heart s1s2  lung dec BS  head nc  head at  on o2 support      Pertinent Lab findings & Imaging      Cassy:  JASON   Adequate UO     I&O's Detail           Discussed with:     Cultures:	        Radiology

## 2022-11-28 NOTE — PROGRESS NOTE ADULT - ASSESSMENT
86 m h/o a fib on Eliquis and BPH who presents to the hospital c/o shortness of breath. The day prior to admission patient notes he began to experience chest pain that was non-radiating as well as SOB.   hypoxia in ED 88%, covid 19 + superimposed bacterial pna on ct with left sided infiltrate, bandemia     RECOMMENDATIONS  supportive oxygen  agree with remdesivir x 5 days started 11/27-11/31  decadron x 10 days started 11/27  ac - eliquis    concern for secondary bacterial PNA soagree with antibx   ceftriaxone and azithromycin  check urine legionella if neg stop azithromycin  trend biomarkers    Urology issues-pt feels it is important that his urologist Dr Green is update regarding his status    Thank you for consulting us and involving us in the management of this most interesting and challenging case.  We will follow along in the care of this patient. Please call us at [phone] or text me directly on my cell# at [phone] with any concerns.

## 2022-11-29 ENCOUNTER — TRANSCRIPTION ENCOUNTER (OUTPATIENT)
Age: 87
End: 2022-11-29

## 2022-11-29 DIAGNOSIS — E78.5 HYPERLIPIDEMIA, UNSPECIFIED: ICD-10-CM

## 2022-11-29 LAB
ALBUMIN SERPL ELPH-MCNC: 2.9 G/DL — LOW (ref 3.3–5)
ALP SERPL-CCNC: 84 U/L — SIGNIFICANT CHANGE UP (ref 40–120)
ALT FLD-CCNC: 52 U/L — SIGNIFICANT CHANGE UP (ref 12–78)
ANION GAP SERPL CALC-SCNC: 6 MMOL/L — SIGNIFICANT CHANGE UP (ref 5–17)
AST SERPL-CCNC: 38 U/L — HIGH (ref 15–37)
BASOPHILS # BLD AUTO: 0.01 K/UL — SIGNIFICANT CHANGE UP (ref 0–0.2)
BASOPHILS NFR BLD AUTO: 0.1 % — SIGNIFICANT CHANGE UP (ref 0–2)
BILIRUB SERPL-MCNC: 0.6 MG/DL — SIGNIFICANT CHANGE UP (ref 0.2–1.2)
BUN SERPL-MCNC: 13 MG/DL — SIGNIFICANT CHANGE UP (ref 7–23)
CALCIUM SERPL-MCNC: 8.6 MG/DL — SIGNIFICANT CHANGE UP (ref 8.5–10.1)
CHLORIDE SERPL-SCNC: 110 MMOL/L — HIGH (ref 96–108)
CO2 SERPL-SCNC: 27 MMOL/L — SIGNIFICANT CHANGE UP (ref 22–31)
CREAT SERPL-MCNC: 0.74 MG/DL — SIGNIFICANT CHANGE UP (ref 0.5–1.3)
CRP SERPL-MCNC: 137 MG/L — HIGH
EGFR: 88 ML/MIN/1.73M2 — SIGNIFICANT CHANGE UP
EOSINOPHIL # BLD AUTO: 0 K/UL — SIGNIFICANT CHANGE UP (ref 0–0.5)
EOSINOPHIL NFR BLD AUTO: 0 % — SIGNIFICANT CHANGE UP (ref 0–6)
FERRITIN SERPL-MCNC: 600 NG/ML — HIGH (ref 30–400)
GLUCOSE SERPL-MCNC: 139 MG/DL — HIGH (ref 70–99)
HCT VFR BLD CALC: 41.5 % — SIGNIFICANT CHANGE UP (ref 39–50)
HGB BLD-MCNC: 13.9 G/DL — SIGNIFICANT CHANGE UP (ref 13–17)
IMM GRANULOCYTES NFR BLD AUTO: 0.5 % — SIGNIFICANT CHANGE UP (ref 0–0.9)
LYMPHOCYTES # BLD AUTO: 0.57 K/UL — LOW (ref 1–3.3)
LYMPHOCYTES # BLD AUTO: 5.1 % — LOW (ref 13–44)
MAGNESIUM SERPL-MCNC: 2 MG/DL — SIGNIFICANT CHANGE UP (ref 1.6–2.6)
MCHC RBC-ENTMCNC: 31.7 PG — SIGNIFICANT CHANGE UP (ref 27–34)
MCHC RBC-ENTMCNC: 33.5 GM/DL — SIGNIFICANT CHANGE UP (ref 32–36)
MCV RBC AUTO: 94.7 FL — SIGNIFICANT CHANGE UP (ref 80–100)
MONOCYTES # BLD AUTO: 0.3 K/UL — SIGNIFICANT CHANGE UP (ref 0–0.9)
MONOCYTES NFR BLD AUTO: 2.7 % — SIGNIFICANT CHANGE UP (ref 2–14)
NEUTROPHILS # BLD AUTO: 10.25 K/UL — HIGH (ref 1.8–7.4)
NEUTROPHILS NFR BLD AUTO: 91.6 % — HIGH (ref 43–77)
NRBC # BLD: 0 /100 WBCS — SIGNIFICANT CHANGE UP (ref 0–0)
PLATELET # BLD AUTO: 194 K/UL — SIGNIFICANT CHANGE UP (ref 150–400)
POTASSIUM SERPL-MCNC: 3.7 MMOL/L — SIGNIFICANT CHANGE UP (ref 3.5–5.3)
POTASSIUM SERPL-SCNC: 3.7 MMOL/L — SIGNIFICANT CHANGE UP (ref 3.5–5.3)
PROCALCITONIN SERPL-MCNC: 2.32 NG/ML — HIGH
PROT SERPL-MCNC: 6.6 G/DL — SIGNIFICANT CHANGE UP (ref 6–8.3)
RBC # BLD: 4.38 M/UL — SIGNIFICANT CHANGE UP (ref 4.2–5.8)
RBC # FLD: 13.4 % — SIGNIFICANT CHANGE UP (ref 10.3–14.5)
S PNEUM AG UR QL: NEGATIVE — SIGNIFICANT CHANGE UP
SODIUM SERPL-SCNC: 143 MMOL/L — SIGNIFICANT CHANGE UP (ref 135–145)
WBC # BLD: 11.19 K/UL — HIGH (ref 3.8–10.5)
WBC # FLD AUTO: 11.19 K/UL — HIGH (ref 3.8–10.5)

## 2022-11-29 PROCEDURE — 99233 SBSQ HOSP IP/OBS HIGH 50: CPT | Mod: GC

## 2022-11-29 RX ADMIN — Medication 25 MILLIGRAM(S): at 17:28

## 2022-11-29 RX ADMIN — Medication 3 MILLIGRAM(S): at 21:15

## 2022-11-29 RX ADMIN — CEFTRIAXONE 100 MILLIGRAM(S): 500 INJECTION, POWDER, FOR SOLUTION INTRAMUSCULAR; INTRAVENOUS at 05:04

## 2022-11-29 RX ADMIN — TAMSULOSIN HYDROCHLORIDE 0.4 MILLIGRAM(S): 0.4 CAPSULE ORAL at 21:14

## 2022-11-29 RX ADMIN — FINASTERIDE 5 MILLIGRAM(S): 5 TABLET, FILM COATED ORAL at 11:38

## 2022-11-29 RX ADMIN — APIXABAN 5 MILLIGRAM(S): 2.5 TABLET, FILM COATED ORAL at 17:29

## 2022-11-29 RX ADMIN — ATORVASTATIN CALCIUM 20 MILLIGRAM(S): 80 TABLET, FILM COATED ORAL at 21:14

## 2022-11-29 RX ADMIN — AZITHROMYCIN 255 MILLIGRAM(S): 500 TABLET, FILM COATED ORAL at 06:46

## 2022-11-29 RX ADMIN — Medication 6 MILLIGRAM(S): at 05:05

## 2022-11-29 RX ADMIN — REMDESIVIR 500 MILLIGRAM(S): 5 INJECTION INTRAVENOUS at 10:36

## 2022-11-29 RX ADMIN — APIXABAN 5 MILLIGRAM(S): 2.5 TABLET, FILM COATED ORAL at 05:04

## 2022-11-29 RX ADMIN — PANTOPRAZOLE SODIUM 40 MILLIGRAM(S): 20 TABLET, DELAYED RELEASE ORAL at 11:38

## 2022-11-29 NOTE — DISCHARGE NOTE PROVIDER - HOSPITAL COURSE
ADMISSION DATE:  11-27-22    ---  FROM ADMISSION H+P:   HPI:  Pt is an 87 y/o male with hx a fib on Eliquis and BPH who presents to the hospital c/o shortness of breath. The day prior to admission patient notes he began to experience chest pain that was non-radiating as well as SOB. Overnight the patient's wife reports that the patient tried to go to the bathroom and was off balance and experiencing "poor breathing." Pt endorses productive cough also x 1 day, brown/pink tinged. Patient and wife do not endorse any sick contacts. Associated symptoms include + chills, + productive cough, + "wheezing". Patient denies feeling febrile, CP, n/v, diarrhea.    IN THE ED:  Temp 101.8F, , /65, RR 18, 88 SpO2 on room air  Labs significant for: WBC 10.5 (neutrophil predominant) w/ 10% bands, Potassium 3.3, COVID+, UA: Large blood w/ >50 RBC, 30 protein.   Imaging  CXR: Wet read increased opacity in LLL and RLL  EKG: Afib, rate 107  Received in ED: Azithromycin IV, Ceftriaxone IV, NS 2.4L  Consults: Pulmonology (27 Nov 2022 07:12)      ---  HOSPITAL COURSE/PERTINENT LABS/PROCEDURES PERFORMED/PENDING TESTS:   Pt was admitted for COVID, Pneumonia, and Hematuria.   For COVID, patient was treated with remdesivir for 5 days and dexamethasone for 10 days. For pneumonia, patient was treated with IV antibiotics and given supplemental oxygen. The gross hematuria resolved.     Patient is stable for discharge as per primary medical team and consultants.    PT consulted, recommends discharge ______    Patient showed improvement throughout hospitalization. Patient was seen and examined on day of discharge. Patient was medically optimized for discharge with close outpatient follow up.    ---  PATIENT CONDITION:  - stable    --  VITALS:   T(C): 36.8 (11-29-22 @ 11:19), Max: 36.8 (11-28-22 @ 20:09)  HR: 65 (11-29-22 @ 11:19) (60 - 70)  BP: 120/60 (11-29-22 @ 11:19) (105/60 - 127/54)  RR: 18 (11-29-22 @ 11:19) (18 - 19)  SpO2: 95% (11-29-22 @ 11:19) (94% - 95%)    PHYSICAL EXAM ON DAY OF DISCHARGE:    ---  CONSULTANTS:   -Dr. Barraza, infectious disease  -Dr. Nunez, pulmonology    ---  ADVANCED CARE PLANNING:  - Code status:      - MOLST completed:      [  ] NO     [  ] YES    ---  TIME SPENT:  I, the attending physician, was physically present for the key portions of the evaluation and management (E/M) service provided. The total amount of time spent reviewing the hospital notes, laboratory values, imaging findings, assessing/counseling the patient, discussing with consultant physicians, social work, nursing staff was -- minutes       ADMISSION DATE:  11-27-22    ---  FROM ADMISSION H+P:   HPI:  Pt is an 87 y/o male with hx a fib on Eliquis and BPH who presents to the hospital c/o shortness of breath. The day prior to admission patient notes he began to experience chest pain that was non-radiating as well as SOB. Overnight the patient's wife reports that the patient tried to go to the bathroom and was off balance and experiencing "poor breathing." Pt endorses productive cough also x 1 day, brown/pink tinged. Patient and wife do not endorse any sick contacts. Associated symptoms include + chills, + productive cough, + "wheezing". Patient denies feeling febrile, CP, n/v, diarrhea.    IN THE ED:  Temp 101.8F, , /65, RR 18, 88 SpO2 on room air  Labs significant for: WBC 10.5 (neutrophil predominant) w/ 10% bands, Potassium 3.3, COVID+, UA: Large blood w/ >50 RBC, 30 protein.   Imaging  CXR: Wet read increased opacity in LLL and RLL  EKG: Afib, rate 107  Received in ED: Azithromycin IV, Ceftriaxone IV, NS 2.4L  Consults: Pulmonology (27 Nov 2022 07:12)      ---  HOSPITAL COURSE/PERTINENT LABS/PROCEDURES PERFORMED/PENDING TESTS:   Pt was admitted for COVID, Pneumonia, and Hematuria.   For COVID, patient was treated with remdesivir for 5 days and dexamethasone for 10 days. For pneumonia, patient was treated with IV antibiotics and given supplemental oxygen. The gross hematuria resolved.     Patient is stable for discharge as per primary medical team and consultants.    PT consulted, recommends discharge home.    Patient showed improvement throughout hospitalization. Patient was seen and examined on day of discharge. Patient was medically optimized for discharge with close outpatient follow up.    ---  PATIENT CONDITION:  - stable    --  VITAL SIGNS:  T(C): 36.7 (12-01-22 @ 05:06), Max: 36.9 (11-30-22 @ 11:34)  HR: 66 (12-01-22 @ 05:06) (55 - 82)  BP: 135/67 (12-01-22 @ 05:06) (117/55 - 144/77)  RR: 18 (12-01-22 @ 05:06) (18 - 19)  SpO2: 93% (12-01-22 @ 05:06) (93% - 94%)    PHYSICAL EXAM ON DAY OF DISCHARGE:  GENERAL: NAD  HEENT:  anicteric, moist mucous membranes  CHEST/LUNG:  Left lower lung field coarse breath sounds, rest generally CTA though exam limited with isolation room stethoscope   HEART:  RRR, S1, S2 , no tachy   ABDOMEN:  BS+, soft, nontender, nondistended  EXTREMITIES: no edema, cyanosis, or calf tenderness  NERVOUS SYSTEM: answers questions and follows commands appropriately  gu intact    ---  CONSULTANTS:   -Dr. Barraza, infectious disease  -Dr. Nunez, pulmonology    ---  ADVANCED CARE PLANNING:  - Code status:      - MOLST completed:      [  ] NO     [  ] YES    ---  TIME SPENT:  I, the attending physician, was physically present for the key portions of the evaluation and management (E/M) service provided. The total amount of time spent reviewing the hospital notes, laboratory values, imaging findings, assessing/counseling the patient, discussing with consultant physicians, social work, nursing staff was -- minutes       ADMISSION DATE:  11-27-22    ---  FROM ADMISSION H+P:   HPI:  Pt is an 87 y/o male with hx a fib on Eliquis and BPH who presents to the hospital c/o shortness of breath. The day prior to admission patient notes he began to experience chest pain that was non-radiating as well as SOB. Overnight the patient's wife reports that the patient tried to go to the bathroom and was off balance and experiencing "poor breathing." Pt endorses productive cough also x 1 day, brown/pink tinged. Patient and wife do not endorse any sick contacts. Associated symptoms include + chills, + productive cough, + "wheezing". Patient denies feeling febrile, CP, n/v, diarrhea.    IN THE ED:  Temp 101.8F, , /65, RR 18, 88 SpO2 on room air  Labs significant for: WBC 10.5 (neutrophil predominant) w/ 10% bands, Potassium 3.3, COVID+, UA: Large blood w/ >50 RBC, 30 protein.   Imaging  CXR: Wet read increased opacity in LLL and RLL  EKG: Afib, rate 107  Received in ED: Azithromycin IV, Ceftriaxone IV, NS 2.4L  Consults: Pulmonology (27 Nov 2022 07:12)      ---  HOSPITAL COURSE/PERTINENT LABS/PROCEDURES PERFORMED/PENDING TESTS:   Pt was admitted for  Acute respiratory failure with hypoxia - Pt symptomatic, febrile w/ leukocytosis and bandemia, tachycardia on admission w/ CXR findings of PNA--meets sepsis criteria (POA)   For COVID, patient was treated with remdesivir for 5 days and dexamethasone for 10 days.   hematuria   possible sec to hx bph  resolved hb remain stable.   For pneumonia, patient was treated with IV antibiotics and given supplemental oxygen.   Patient is stable for discharge as per primary medical team and consultants.    PT consulted, recommends discharge home.  Patient showed improvement throughout hospitalization=   Patient was medically optimized for discharge with close outpatient follow up.    ---  PATIENT CONDITION:  - stable    --  VITAL SIGNS:  T(C): 36.7 (12-01-22 @ 05:06), Max: 36.9 (11-30-22 @ 11:34)  HR: 66 (12-01-22 @ 05:06) (55 - 82)  BP: 135/67 (12-01-22 @ 05:06) (117/55 - 144/77)  RR: 18 (12-01-22 @ 05:06) (18 - 19)  SpO2: 93% (12-01-22 @ 05:06) (93% - 94%)    PHYSICAL EXAM ON DAY OF DISCHARGE:  GENERAL: NAD  HEENT:  anicteric, moist mucous membranes  CHEST/LUNG:  Left lower lung field coarse breath sounds,  pt  rest generally CTA though exam limited with isolation room stethoscope   HEART:  RRR, S1, S2 , no tachy   ABDOMEN:  BS+, soft, nontender, nondistended  EXTREMITIES: no edema, cyanosis, or calf tenderness  NERVOUS SYSTEM: answers questions and follows commands appropriately  gu intact    ---  CONSULTANTS:   -Dr. Barraza, infectious disease  -Dr. Nunez, pulmonology    --    ---  TIME SPENT:  I, the attending physician, was physically present for the key portions of the evaluation and management (E/M) service provided. The total amount of time spent reviewing the hospital notes, laboratory values, imaging findings, assessing/counseling the patient, discussing with consultant physicians, social work, nursing staff was -40 minutes       ADMISSION DATE:  11-27-22    ---  FROM ADMISSION H+P:   HPI:  Pt is an 87 y/o male with hx a fib on Eliquis and BPH who presents to the hospital c/o shortness of breath. The day prior to admission patient notes he began to experience chest pain that was non-radiating as well as SOB. Overnight the patient's wife reports that the patient tried to go to the bathroom and was off balance and experiencing "poor breathing." Pt endorses productive cough also x 1 day, brown/pink tinged. Patient and wife do not endorse any sick contacts. Associated symptoms include + chills, + productive cough, + "wheezing". Patient denies feeling febrile, CP, n/v, diarrhea.    IN THE ED:  Temp 101.8F, , /65, RR 18, 88 SpO2 on room air  Labs significant for: WBC 10.5 (neutrophil predominant) w/ 10% bands, Potassium 3.3, COVID+, UA: Large blood w/ >50 RBC, 30 protein.   Imaging  CXR: Wet read increased opacity in LLL and RLL  EKG: Afib, rate 107  Received in ED: Azithromycin IV, Ceftriaxone IV, NS 2.4L  Consults: Pulmonology (27 Nov 2022 07:12)      ---  HOSPITAL COURSE/PERTINENT LABS/PROCEDURES PERFORMED/PENDING TESTS:   Pt was admitted for  Acute respiratory failure with hypoxia - Pt symptomatic, febrile w/ leukocytosis and bandemia, tachycardia on admission w/ CXR findings of PNA--meets sepsis criteria (POA)   For COVID, patient was treated with remdesivir for 5 days and dexamethasone for 10 days.   hematuria   possible sec to hx bph  resolved hb remain stable / fu up out pt urologist dr ann .   For pneumonia, patient was treated with IV antibiotics and given supplemental oxygen.   Patient is stable for discharge as per primary medical team and consultants.    PT consulted, recommends discharge home.  Patient showed improvement throughout hospitalization=   Patient was medically optimized for discharge with close outpatient follow up.    ---  PATIENT CONDITION:  - stable    --  VITAL SIGNS:  T(C): 36.7 (12-01-22 @ 05:06), Max: 36.9 (11-30-22 @ 11:34)  HR: 66 (12-01-22 @ 05:06) (55 - 82)  BP: 135/67 (12-01-22 @ 05:06) (117/55 - 144/77)  RR: 18 (12-01-22 @ 05:06) (18 - 19)  SpO2: 93% (12-01-22 @ 05:06) (93% - 94%)    PHYSICAL EXAM ON DAY OF DISCHARGE:  GENERAL: NAD  HEENT:  anicteric, moist mucous membranes  CHEST/LUNG:  Left lower lung field coarse breath sounds,  pt  rest generally CTA though exam limited with isolation room stethoscope   HEART:  RRR, S1, S2 , no tachy   ABDOMEN:  BS+, soft, nontender, nondistended  EXTREMITIES: no edema, cyanosis, or calf tenderness  NERVOUS SYSTEM: answers questions and follows commands appropriately  gu intact    ---  CONSULTANTS:   -Dr. Barrzaa, infectious disease  -Dr. Nunez, pulmonology    --    ---  TIME SPENT:  I, the attending physician, was physically present for the key portions of the evaluation and management (E/M) service provided. The total amount of time spent reviewing the hospital notes, laboratory values, imaging findings, assessing/counseling the patient, discussing with consultant physicians, social work, nursing staff was -40 minutes

## 2022-11-29 NOTE — PROGRESS NOTE ADULT - SUBJECTIVE AND OBJECTIVE BOX
[facility] DIV[facility] of INFECTIOUS DISEASE  Umair Barraza MD PhD, Sandra Hall MD, Mary Malcolm MD, Breonna Hannon MD, Steve Guy MD  and providing coverage with Gerard Arrington MD  Providing Infectious Disease Consultations at [facility], [facility], [place], [facility], [facility]'s    Office# [phone] to schedule follow up appointments  Answering Service for urgent calls or New Consults [phone]  Cell# to text for urgent issues Umair Barraza [phone]     infectious diseases progress note:    ZENAIDA CORADO is a 86y y. o. Male patient    Overnight and events of the last 24hrs reviewed    Allergies    No Known Allergies    Intolerances        ANTIBIOTICS/RELEVANT:  antimicrobials  cefTRIAXone   IVPB 1000 milliGRAM(s) IV Intermittent every 24 hours  remdesivir  IVPB   IV Intermittent   remdesivir  IVPB 100 milliGRAM(s) IV Intermittent every 24 hours    immunologic:    OTHER:  acetaminophen     Tablet .. 650 milliGRAM(s) Oral every 6 hours PRN  albuterol    90 MICROgram(s) HFA Inhaler 2 Puff(s) Inhalation every 6 hours PRN  aluminum hydroxide/magnesium hydroxide/simethicone Suspension 30 milliLiter(s) Oral every 4 hours PRN  apixaban 5 milliGRAM(s) Oral two times a day  atorvastatin 20 milliGRAM(s) Oral at bedtime  dexAMETHasone     Tablet 6 milliGRAM(s) Oral daily  finasteride 5 milliGRAM(s) Oral daily  guaiFENesin Oral Liquid (Sugar-Free) 200 milliGRAM(s) Oral every 6 hours PRN  melatonin 3 milliGRAM(s) Oral at bedtime PRN  metoprolol succinate ER 25 milliGRAM(s) Oral every 12 hours  ondansetron Injectable 4 milliGRAM(s) IV Push every 8 hours PRN  pantoprazole    Tablet 40 milliGRAM(s) Oral daily  polyethylene glycol 3350 17 Gram(s) Oral daily  tamsulosin 0.4 milliGRAM(s) Oral at bedtime      Objective:  Vital Signs Last 24 Hrs  T(C): 36.8 (29 Nov 2022 11:19), Max: 37 (28 Nov 2022 13:47)  T(F): 98.3 (29 Nov 2022 11:19), Max: 98.6 (28 Nov 2022 13:47)  HR: 65 (29 Nov 2022 11:19) (60 - 83)  BP: 120/60 (29 Nov 2022 11:19) (105/60 - 127/54)  BP(mean): --  RR: 18 (29 Nov 2022 11:19) (18 - 20)  SpO2: 95% (29 Nov 2022 11:19) (94% - 95%)    Parameters below as of 29 Nov 2022 11:19  Patient On (Oxygen Delivery Method): nasal cannula        T(C): 36.8 (11-29-22 @ 11:19), Max: 38.8 (11-27-22 @ 17:16)  T(C): 36.8 (11-29-22 @ 11:19), Max: 38.8 (11-27-22 @ 04:42)  T(C): 36.8 (11-29-22 @ 11:19), Max: 38.8 (11-27-22 @ 04:42)    PHYSICAL EXAM:  HEENT: NC atraumatic  Neck: supple  Respiratory: no accessory muscle use, breathing comfortably  Cardiovascular: distant  Gastrointestinal: normal appearing, nondistended  Extremities: no clubbing, no cyanosis,        LABS:                          13.9   11.19 )-----------( 194      ( 29 Nov 2022 09:12 )             41.5       WBC  11.19 11-29 @ 09:12  12.65 11-28 @ 07:30  10.52 11-27 @ 06:00      11-29    143  |  110<H>  |  13  ----------------------------<  139<H>  3.7   |  27  |  0.74    Ca    8.6      29 Nov 2022 09:12  Mg     2.0     11-29    TPro  6.6  /  Alb  2.9<L>  /  TBili  0.6  /  DBili  x   /  AST  38<H>  /  ALT  52  /  AlkPhos  84  11-29      Creatinine, Serum: 0.74 mg/dL (11-29-22 @ 09:12)  Creatinine, Serum: 0.83 mg/dL (11-27-22 @ 06:00)                INFLAMMATORY MARKERS      MICROBIOLOGY:              RADIOLOGY & ADDITIONAL STUDIES:

## 2022-11-29 NOTE — DISCHARGE NOTE PROVIDER - CARE PROVIDER_API CALL
Milo Green)  Urology  [address], [address]  [place] [address]  Phone: [phone]  Fax: [phone]  Established Patient  Follow Up Time: 1 week   Milo Green)  Urology  [address], [address]  [place] [address]  Phone: [phone]  Fax: [phone]  Established Patient  Follow Up Time: 1 week    Dr. Rodríguez,   Phone: (   )    -  Fax: (   )    -  Established Patient  Follow Up Time: 1 week

## 2022-11-29 NOTE — DISCHARGE NOTE PROVIDER - CARE PROVIDERS DIRECT ADDRESSES
[email or web address],DirectAddress_Unknown

## 2022-11-29 NOTE — PROGRESS NOTE ADULT - SUBJECTIVE AND OBJECTIVE BOX
Date/Time Patient Seen:  		  Referring MD:   Data Reviewed	       Patient is a 86y old  Male who presents with a chief complaint of COVID (28 Nov 2022 13:52)      Subjective/HPI     PAST MEDICAL & SURGICAL HISTORY:  Afib    BPH (benign prostatic hyperplasia)    History of hypotension          Medication list         MEDICATIONS  (STANDING):  apixaban 5 milliGRAM(s) Oral two times a day  atorvastatin 20 milliGRAM(s) Oral at bedtime  azithromycin  IVPB 500 milliGRAM(s) IV Intermittent every 24 hours  cefTRIAXone   IVPB 1000 milliGRAM(s) IV Intermittent every 24 hours  dexAMETHasone     Tablet 6 milliGRAM(s) Oral daily  finasteride 5 milliGRAM(s) Oral daily  metoprolol succinate ER 25 milliGRAM(s) Oral every 12 hours  pantoprazole    Tablet 40 milliGRAM(s) Oral daily  polyethylene glycol 3350 17 Gram(s) Oral daily  remdesivir  IVPB   IV Intermittent   remdesivir  IVPB 100 milliGRAM(s) IV Intermittent every 24 hours  tamsulosin 0.4 milliGRAM(s) Oral at bedtime    MEDICATIONS  (PRN):  acetaminophen     Tablet .. 650 milliGRAM(s) Oral every 6 hours PRN Temp greater or equal to 38C (100.4F), Mild Pain (1 - 3)  albuterol    90 MICROgram(s) HFA Inhaler 2 Puff(s) Inhalation every 6 hours PRN Shortness of Breath and/or Wheezing  aluminum hydroxide/magnesium hydroxide/simethicone Suspension 30 milliLiter(s) Oral every 4 hours PRN Dyspepsia  guaiFENesin Oral Liquid (Sugar-Free) 200 milliGRAM(s) Oral every 6 hours PRN Cough  melatonin 3 milliGRAM(s) Oral at bedtime PRN Insomnia  ondansetron Injectable 4 milliGRAM(s) IV Push every 8 hours PRN Nausea and/or Vomiting         Vitals log        ICU Vital Signs Last 24 Hrs  T(C): 36.8 (29 Nov 2022 04:42), Max: 37 (28 Nov 2022 13:47)  T(F): 98.2 (29 Nov 2022 04:42), Max: 98.6 (28 Nov 2022 13:47)  HR: 60 (29 Nov 2022 04:42) (60 - 83)  BP: 105/60 (29 Nov 2022 04:42) (105/60 - 127/54)  BP(mean): --  ABP: --  ABP(mean): --  RR: 18 (29 Nov 2022 04:42) (18 - 20)  SpO2: 94% (29 Nov 2022 04:42) (94% - 94%)    O2 Parameters below as of 29 Nov 2022 04:42  Patient On (Oxygen Delivery Method): nasal cannula  O2 Flow (L/min): 3               Input and Output:  I&O's Detail    27 Nov 2022 07:01  -  28 Nov 2022 07:00  --------------------------------------------------------  IN:  Total IN: 0 mL    OUT:    Voided (mL): 550 mL  Total OUT: 550 mL    Total NET: -550 mL      28 Nov 2022 07:01  -  29 Nov 2022 06:30  --------------------------------------------------------  IN:  Total IN: 0 mL    OUT:    Voided (mL): 800 mL  Total OUT: 800 mL    Total NET: -800 mL          Lab Data                        12.8   12.65 )-----------( 167      ( 28 Nov 2022 07:30 )             38.5                   Review of Systems	      Objective     Physical Examination  heart s1s2  lung dec BS  head nc        Pertinent Lab findings & Imaging      Cassy:  NO   Adequate UO     I&O's Detail    27 Nov 2022 07:01  -  28 Nov 2022 07:00  --------------------------------------------------------  IN:  Total IN: 0 mL    OUT:    Voided (mL): 550 mL  Total OUT: 550 mL    Total NET: -550 mL      28 Nov 2022 07:01  -  29 Nov 2022 06:30  --------------------------------------------------------  IN:  Total IN: 0 mL    OUT:    Voided (mL): 800 mL  Total OUT: 800 mL    Total NET: -800 mL               Discussed with:     Cultures:	        Radiology

## 2022-11-29 NOTE — PROGRESS NOTE ADULT - ASSESSMENT
86 m h/o a fib on Eliquis and BPH who presents to the hospital c/o shortness of breath. The day prior to admission patient notes he began to experience chest pain that was non-radiating as well as SOB.   hypoxia in ED 88%, covid 19 + superimposed bacterial pna on ct with left sided infiltrate, bandemia     RECOMMENDATIONS  supportive oxygen  agree with remdesivir x 5 days started 11/27-11/31  decadron x 10 days started 11/27  ac - eliquis    concern for secondary bacterial PNA so agree with antibx   ceftriaxone (started 11/27) continue for now  neg urine legionella so stopped azithromycin  trend biomarkers      Thank you for consulting us and involving us in the management of this most interesting and challenging case.  We will follow along in the care of this patient. Please call us at [phone] or text me directly on my cell# at [phone] with any concerns.

## 2022-11-29 NOTE — PROGRESS NOTE ADULT - PROBLEM SELECTOR PLAN 1
Pt symptomatic, febrile w/ leukocytosis and bandemia, tachycardia on admission w/ CXR findings--meets sepsis criteria  - S/p 2.4L NC, ceftriaxone, azithromycin in ED  - 11/27 CXR: There is an irregular left mid lateral infiltrate and possibly a right base infiltrate.  - 11/27 CT with multifocal pneumonia   - Found to be COVID+, RVP negative otherwise  - Continue ceftriaxone for suspected superimposed bacterial pneumonia   - s/p azithromycin with Legionella neg  - F/u BCx x2, MRSA PCR, Strep antigen  - Satting well on 2L NC; Continue O2 support, ween and titrate as needed  - Monitor for signs and symptoms of worsening infection, SOB, O2 desaturation  - Tylenol PRN for fever  - Pulmonology on board, recs appreciated - Pt symptomatic, febrile w/ leukocytosis and bandemia, tachycardia on admission w/ CXR findings of PNA--meets sepsis criteria (POA)  - S/p 2.4L NC, ceftriaxone, azithromycin in ED  - 11/27 CXR: There is an irregular left mid lateral infiltrate and possibly a right base infiltrate.  - 11/27 CT with multifocal pneumonia   - Found to be COVID+, RVP negative otherwise  - Continue ceftriaxone for suspected superimposed bacterial pneumonia   - s/p azithromycin with Legionella neg  - F/u BCx x2, MRSA PCR, Strep antigen  - Continue Remdesivir and Dexamethasone for COVID  - Satting well on 2L NC; Continue O2 support, wean off if able  - Monitor for signs and symptoms of worsening infection, SOB, O2 desaturation  - Tylenol PRN for fever  - Pulmonology (Dr. Nunez), recs appreciated  - ID Dr. Barraza, recs appreciated

## 2022-11-29 NOTE — PROGRESS NOTE ADULT - ASSESSMENT
86y Male complaining of shortness of breath - fever - chills - dyspnea - cough -     BPH  AF  eval for Lower resp tract infection  hx of Asbestos exposure  Hematuria  OA  COVID with HYPOXEMIA      strep and legionella ag neg  on ABX  vs noted  on o2 support  SLP eval noted    D dimer  DVT p - pt is on ELIQUIS  [facility] rx regimen and BP control  Remdesivir and Decadron for COVID with Hypoxemia  monitor VS and HD  keep sat > 88 pct  ACAP and Robitussin PRN for sx management  isolation precs  nutrition  old records reviewed  spoke with pt - wife -

## 2022-11-29 NOTE — PROGRESS NOTE ADULT - PROBLEM SELECTOR PLAN 2
Pt meeting sepsis criteria on admission  - Satting well on 2L NC  - CXR as above  - 1 day since symptom onset   - maintain isolation precautions - airborne and contact  - monitor for signs and symptoms of worsening infection, SOB, O2 desaturation  - Continue Remdesivir and Dexamethasone    - ID consult Dr. Barraza appreciated - Pt meeting sepsis criteria on admission  - Satting well on 2L NC  - CXR as above  - 1 day since symptom onset   - maintain isolation precautions - airborne and contact  - monitor for signs and symptoms of worsening infection, SOB, O2 desaturation  - Continue Remdesivir and Dexamethasone    - ID Dr. Barraza, recs appreciated

## 2022-11-29 NOTE — DISCHARGE NOTE PROVIDER - NSDCMRMEDTOKEN_GEN_ALL_CORE_FT
apixaban 5 mg oral tablet: 1 tab(s) orally 2 times a day  cefuroxime:   celecoxib:   dutasteride 0.5 mg oral capsule: 1 cap(s) orally once a day  Metoprolol Succinate ER 25 mg oral tablet, extended release: 25 milligram(s) orally 2 times a day  Pyridium 200 mg oral tablet: 1 tab(s) orally 2 times a day   tamsulosin 0.4 mg oral capsule: 1 cap(s) orally once a day   albuterol 90 mcg/inh inhalation aerosol: 2 puff(s) inhaled every 6 hours, As needed, Shortness of Breath and/or Wheezing  apixaban 5 mg oral tablet: 1 tab(s) orally 2 times a day  atorvastatin 20 mg oral tablet: 1 tab(s) orally once a day (at bedtime)  dexamethasone 6 mg oral tablet: 1 tab(s) orally once a day MDD:1 take until completion  finasteride 5 mg oral tablet: 1 tab(s) orally once a day  metoprolol succinate 25 mg oral tablet, extended release: 1 tab(s) orally every 12 hours  tamsulosin 0.4 mg oral capsule: 1 cap(s) orally once a day (at bedtime)

## 2022-11-29 NOTE — DISCHARGE NOTE PROVIDER - NSDCCPCAREPLAN_GEN_ALL_CORE_FT
PRINCIPAL DISCHARGE DIAGNOSIS  Diagnosis: Pneumonia due to COVID-19 virus  Assessment and Plan of Treatment: You have tested POSITIVE for the novel coronavirus (COVID-19).   Please follow the prevention steps below until a healthcare provider or local or state health department says you can return to your normal activities. You were treated with 5 day course of remedidivir and 10 day course of dexamethasone.   1. Please follow up with your primary care physician within 2-3 weeks of your discharge from the hospital. Please take all medications as prescribed   2. Please restrict activities outside your home, except for getting medical care. Try to avoid using public transportation, ride-sharing, or taxis and separate yourself from other people and animals in your home as much as possible.  3. Please wear a facemask and please cover your coughs and sneeze and clean your hands often.   4. Please avoid sharing personal household items and clean all “high-touch” surfaces everyday.  5. Monitor your symptoms  and if you experience any worsening or recurrence of your symptoms, particularly worsening or high fever, shortness of breathe, extreme fatigue, or bloody cough please call 9-1-1 immediately or report to the nearest Emergency Department. If you have any questions or concerns, please do not hesitate to call the hospital.  6. Patients with confirmed COVID-19 should remain under home isolation precautions for 14 days since the positive COVID-19 test and until the risk of secondary transmission to others is thought to be low. The decision to discontinue home isolation precautions should be made on a case-by-case basis, in consultation with healthcare providers and state and local health departments. Your [facility] Department of Health can be reached at [phone] for further information about COVID-19.      SECONDARY DISCHARGE DIAGNOSES  Diagnosis: Pneumonia  Assessment and Plan of Treatment: You have pneumonia, which is an infection in your lungs. In the hospital, your health care providers helped you breathe better. They also gave you IV antibiotic medicine to help your body get rid of the germs that cause pneumonia. They also made sure you got enough liquids and nutrients.    Diagnosis: Hematuria  Assessment and Plan of Treatment: You were noted to have blood in the urine. This sometimes is a benign condition, but the only way to know is to have it formerly evaluated. You should follow up with a specialist called a Urologist so that they can evaluate it further to be sure that there is no intervention that is needed.     PRINCIPAL DISCHARGE DIAGNOSIS  Diagnosis: Pneumonia due to COVID-19 virus  Assessment and Plan of Treatment: You have tested POSITIVE for the novel coronavirus (COVID-19).   Please follow the prevention steps below until a healthcare provider or local or state health department says you can return to your normal activities. You were treated with 5 day course of remedidivir and 10 day course of dexamethasone.   1. Please follow up with your primary care physician within 2-3 weeks of your discharge from the hospital. Please take all medications as prescribed   2. Please restrict activities outside your home, except for getting medical care. Try to avoid using public transportation, ride-sharing, or taxis and separate yourself from other people and animals in your home as much as possible.  3. Please wear a facemask and please cover your coughs and sneeze and clean your hands often.   4. Please avoid sharing personal household items and clean all “high-touch” surfaces everyday.  5. Monitor your symptoms  and if you experience any worsening or recurrence of your symptoms, particularly worsening or high fever, shortness of breathe, extreme fatigue, or bloody cough please call 9-1-1 immediately or report to the nearest Emergency Department. If you have any questions or concerns, please do not hesitate to call the hospital.  6. Patients with confirmed COVID-19 should remain under home isolation precautions for 14 days since the positive COVID-19 test and until the risk of secondary transmission to others is thought to be low. The decision to discontinue home isolation precautions should be made on a case-by-case basis, in consultation with healthcare providers and state and local health departments. Your [facility] Department of Health can be reached at [phone] for further information about COVID-19.      SECONDARY DISCHARGE DIAGNOSES  Diagnosis: Pneumonia  Assessment and Plan of Treatment: You have pneumonia, which is an infection in your lungs. In the hospital, your health care providers helped you breathe better. They also gave you IV antibiotic medicine to help your body get rid of the germs that cause pneumonia. They also made sure you got enough liquids and nutrients.

## 2022-11-29 NOTE — PROGRESS NOTE ADULT - ASSESSMENT
85 y/o male with hx a fib on Eliquis and BPH who presents to the hospital c/o shortness of breath with purulent sputum, fever, leukocytosis, and lobar opacities on CXR admitted for the work up and management of CAP vs. COVID-19 vs. superimposed CAP on COVID-19                                 87yo M with PMH of a fib on Eliquis and BPH who presents to the hospital c/o shortness of breath with purulent sputum, fever, a/w acute hypoxic respiratory failure and sepsis due to COVID and bacterial PNA.

## 2022-11-29 NOTE — DISCHARGE NOTE PROVIDER - PROVIDER TOKENS
PROVIDER:[TOKEN:[[id number]],FOLLOWUP:[1 week],ESTABLISHEDPATIENT:[T]] PROVIDER:[TOKEN:[[id number]],FOLLOWUP:[1 week],ESTABLISHEDPATIENT:[T]],FREE:[LAST:[Dr. Rodríguez],PHONE:[(   )    -],FAX:[(   )    -],FOLLOWUP:[1 week],ESTABLISHEDPATIENT:[T]]

## 2022-11-29 NOTE — PROGRESS NOTE ADULT - SUBJECTIVE AND OBJECTIVE BOX
Patient is a 86y old  Male who presents with a chief complaint of covid (29 Nov 2022 11:56)      INTERVAL HPI/OVERNIGHT EVENTS: Pt was seen and examined at bedside. No acute overnight events. Pt endorses SOB, cough, productive red tinged sputum. Pt endorses less pain with urination today and resolution of hematuria.   Pt denies headache, dizziness, lightheadedness, fever, chills, body aches, CP, palpitations, abdominal pain, n/v, numbness/tingling.  No other complaints at this time.     MEDICATIONS  (STANDING):  apixaban 5 milliGRAM(s) Oral two times a day  atorvastatin 20 milliGRAM(s) Oral at bedtime  cefTRIAXone   IVPB 1000 milliGRAM(s) IV Intermittent every 24 hours  dexAMETHasone     Tablet 6 milliGRAM(s) Oral daily  finasteride 5 milliGRAM(s) Oral daily  metoprolol succinate ER 25 milliGRAM(s) Oral every 12 hours  pantoprazole    Tablet 40 milliGRAM(s) Oral daily  polyethylene glycol 3350 17 Gram(s) Oral daily  remdesivir  IVPB   IV Intermittent   remdesivir  IVPB 100 milliGRAM(s) IV Intermittent every 24 hours  tamsulosin 0.4 milliGRAM(s) Oral at bedtime    MEDICATIONS  (PRN):  acetaminophen     Tablet .. 650 milliGRAM(s) Oral every 6 hours PRN Temp greater or equal to 38C (100.4F), Mild Pain (1 - 3)  albuterol    90 MICROgram(s) HFA Inhaler 2 Puff(s) Inhalation every 6 hours PRN Shortness of Breath and/or Wheezing  aluminum hydroxide/magnesium hydroxide/simethicone Suspension 30 milliLiter(s) Oral every 4 hours PRN Dyspepsia  guaiFENesin Oral Liquid (Sugar-Free) 200 milliGRAM(s) Oral every 6 hours PRN Cough  melatonin 3 milliGRAM(s) Oral at bedtime PRN Insomnia  ondansetron Injectable 4 milliGRAM(s) IV Push every 8 hours PRN Nausea and/or Vomiting      Allergies    No Known Allergies    Intolerances        REVIEW OF SYSTEMS:  Pt endorses SOB, cough, productive red tinged sputum. Pt endorses less pain with urination today and resolution of hematuria.   Pt denies headache, dizziness, lightheadedness, fever, chills, body aches, CP, palpitations, abdominal pain, n/v, numbness/tingling.  No other complaints at this time.     Vital Signs Last 24 Hrs  T(C): 36.8 (29 Nov 2022 11:19), Max: 37 (28 Nov 2022 13:47)  T(F): 98.3 (29 Nov 2022 11:19), Max: 98.6 (28 Nov 2022 13:47)  HR: 65 (29 Nov 2022 11:19) (60 - 83)  BP: 120/60 (29 Nov 2022 11:19) (105/60 - 127/54)  BP(mean): --  RR: 18 (29 Nov 2022 11:19) (18 - 20)  SpO2: 95% (29 Nov 2022 11:19) (94% - 95%)    Parameters below as of 29 Nov 2022 11:19  Patient On (Oxygen Delivery Method): nasal cannula        PHYSICAL EXAM:  GENERAL: NAD  HEENT:  anicteric, moist mucous membranes  CHEST/LUNG:  Left lower lobe wheeze, CTA b/l, no rales, or rhonchi  HEART:  RRR, S1, S2  ABDOMEN:  BS+, soft, nontender, nondistended  EXTREMITIES: no edema, cyanosis, or calf tenderness  NERVOUS SYSTEM: answers questions and follows commands appropriately    LABS:                        13.9   11.19 )-----------( 194      ( 29 Nov 2022 09:12 )             41.5     CBC Full  -  ( 29 Nov 2022 09:12 )  WBC Count : 11.19 K/uL  Hemoglobin : 13.9 g/dL  Hematocrit : 41.5 %  Platelet Count - Automated : 194 K/uL  Mean Cell Volume : 94.7 fl  Mean Cell Hemoglobin : 31.7 pg  Mean Cell Hemoglobin Concentration : 33.5 gm/dL  Auto Neutrophil # : 10.25 K/uL  Auto Lymphocyte # : 0.57 K/uL  Auto Monocyte # : 0.30 K/uL  Auto Eosinophil # : 0.00 K/uL  Auto Basophil # : 0.01 K/uL  Auto Neutrophil % : 91.6 %  Auto Lymphocyte % : 5.1 %  Auto Monocyte % : 2.7 %  Auto Eosinophil % : 0.0 %  Auto Basophil % : 0.1 %    29 Nov 2022 09:12    143    |  110    |  13     ----------------------------<  139    3.7     |  27     |  0.74     Ca    8.6        29 Nov 2022 09:12  Mg     2.0       29 Nov 2022 09:12    TPro  6.6    /  Alb  2.9    /  TBili  0.6    /  DBili  x      /  AST  38     /  ALT  52     /  AlkPhos  84     29 Nov 2022 09:12        CAPILLARY BLOOD GLUCOSE            Culture - Blood (collected 11-27-22 @ 06:15)  Source: .Blood Blood-Peripheral  Preliminary Report (11-28-22 @ 12:01):    No growth to date.    Culture - Urine (collected 11-27-22 @ 06:00)  Source: Clean Catch Clean Catch (Midstream)  Final Report (11-28-22 @ 12:12):    <10,000 CFU/mL Normal Urogenital Erlinda    Culture - Blood (collected 11-27-22 @ 06:00)  Source: .Blood Blood-Peripheral  Preliminary Report (11-28-22 @ 12:01):    No growth to date.        Personally reviewed.     Consultant(s) Notes Reviewed:  [x] YES  [ ] NO     Patient is a 86y old  Male who presents with a chief complaint of shortness of breath with purulent sputum, fever.      INTERVAL HPI/OVERNIGHT EVENTS: Pt was seen and examined at bedside. No acute overnight events. Pt states SOB and cough are improving. Pt denies hematuria / dysuria now.  Pt denies headache, dizziness, lightheadedness, fever, chills, body aches, CP, palpitations, abdominal pain, n/v, numbness/tingling.    MEDICATIONS  (STANDING):  apixaban 5 milliGRAM(s) Oral two times a day  atorvastatin 20 milliGRAM(s) Oral at bedtime  cefTRIAXone   IVPB 1000 milliGRAM(s) IV Intermittent every 24 hours  dexAMETHasone     Tablet 6 milliGRAM(s) Oral daily  finasteride 5 milliGRAM(s) Oral daily  metoprolol succinate ER 25 milliGRAM(s) Oral every 12 hours  pantoprazole    Tablet 40 milliGRAM(s) Oral daily  polyethylene glycol 3350 17 Gram(s) Oral daily  remdesivir  IVPB   IV Intermittent   remdesivir  IVPB 100 milliGRAM(s) IV Intermittent every 24 hours  tamsulosin 0.4 milliGRAM(s) Oral at bedtime    MEDICATIONS  (PRN):  acetaminophen     Tablet .. 650 milliGRAM(s) Oral every 6 hours PRN Temp greater or equal to 38C (100.4F), Mild Pain (1 - 3)  albuterol    90 MICROgram(s) HFA Inhaler 2 Puff(s) Inhalation every 6 hours PRN Shortness of Breath and/or Wheezing  aluminum hydroxide/magnesium hydroxide/simethicone Suspension 30 milliLiter(s) Oral every 4 hours PRN Dyspepsia  guaiFENesin Oral Liquid (Sugar-Free) 200 milliGRAM(s) Oral every 6 hours PRN Cough  melatonin 3 milliGRAM(s) Oral at bedtime PRN Insomnia  ondansetron Injectable 4 milliGRAM(s) IV Push every 8 hours PRN Nausea and/or Vomiting      Allergies    No Known Allergies    Intolerances        REVIEW OF SYSTEMS:  Pt states SOB and cough are improving. Pt denies dysuria or hematuria now. Pt denies headache, dizziness, lightheadedness, fever, chills, body aches, CP, palpitations, abdominal pain, n/v, numbness/tingling.    Vital Signs Last 24 Hrs  T(C): 36.8 (29 Nov 2022 11:19), Max: 37 (28 Nov 2022 13:47)  T(F): 98.3 (29 Nov 2022 11:19), Max: 98.6 (28 Nov 2022 13:47)  HR: 65 (29 Nov 2022 11:19) (60 - 83)  BP: 120/60 (29 Nov 2022 11:19) (105/60 - 127/54)  BP(mean): --  RR: 18 (29 Nov 2022 11:19) (18 - 20)  SpO2: 95% (29 Nov 2022 11:19) (94% - 95%)    Parameters below as of 29 Nov 2022 11:19  Patient On (Oxygen Delivery Method): nasal cannula        PHYSICAL EXAM:  GENERAL: NAD  HEENT:  anicteric, moist mucous membranes  CHEST/LUNG:  Left lower lung field coarse breath sounds, rest generally CTA though exam limited with isolation room stethoscope   HEART:  RRR, S1, S2  ABDOMEN:  BS+, soft, nontender, nondistended  EXTREMITIES: no edema, cyanosis, or calf tenderness  NERVOUS SYSTEM: answers questions and follows commands appropriately    LABS:                        13.9   11.19 )-----------( 194      ( 29 Nov 2022 09:12 )             41.5     CBC Full  -  ( 29 Nov 2022 09:12 )  WBC Count : 11.19 K/uL  Hemoglobin : 13.9 g/dL  Hematocrit : 41.5 %  Platelet Count - Automated : 194 K/uL  Mean Cell Volume : 94.7 fl  Mean Cell Hemoglobin : 31.7 pg  Mean Cell Hemoglobin Concentration : 33.5 gm/dL  Auto Neutrophil # : 10.25 K/uL  Auto Lymphocyte # : 0.57 K/uL  Auto Monocyte # : 0.30 K/uL  Auto Eosinophil # : 0.00 K/uL  Auto Basophil # : 0.01 K/uL  Auto Neutrophil % : 91.6 %  Auto Lymphocyte % : 5.1 %  Auto Monocyte % : 2.7 %  Auto Eosinophil % : 0.0 %  Auto Basophil % : 0.1 %    29 Nov 2022 09:12    143    |  110    |  13     ----------------------------<  139    3.7     |  27     |  0.74     Ca    8.6        29 Nov 2022 09:12  Mg     2.0       29 Nov 2022 09:12    TPro  6.6    /  Alb  2.9    /  TBili  0.6    /  DBili  x      /  AST  38     /  ALT  52     /  AlkPhos  84     29 Nov 2022 09:12        CAPILLARY BLOOD GLUCOSE            Culture - Blood (collected 11-27-22 @ 06:15)  Source: .Blood Blood-Peripheral  Preliminary Report (11-28-22 @ 12:01):    No growth to date.    Culture - Urine (collected 11-27-22 @ 06:00)  Source: Clean Catch Clean Catch (Midstream)  Final Report (11-28-22 @ 12:12):    <10,000 CFU/mL Normal Urogenital Erlinda    Culture - Blood (collected 11-27-22 @ 06:00)  Source: .Blood Blood-Peripheral  Preliminary Report (11-28-22 @ 12:01):    No growth to date.        Personally reviewed.     Consultant(s) Notes Reviewed:  [x] YES  [ ] NO

## 2022-11-30 DIAGNOSIS — J96.01 ACUTE RESPIRATORY FAILURE WITH HYPOXIA: ICD-10-CM

## 2022-11-30 LAB
ALBUMIN SERPL ELPH-MCNC: 2.9 G/DL — LOW (ref 3.3–5)
ALP SERPL-CCNC: 81 U/L — SIGNIFICANT CHANGE UP (ref 40–120)
ALT FLD-CCNC: 45 U/L — SIGNIFICANT CHANGE UP (ref 12–78)
ANION GAP SERPL CALC-SCNC: 7 MMOL/L — SIGNIFICANT CHANGE UP (ref 5–17)
AST SERPL-CCNC: 29 U/L — SIGNIFICANT CHANGE UP (ref 15–37)
BASOPHILS # BLD AUTO: 0 K/UL — SIGNIFICANT CHANGE UP (ref 0–0.2)
BASOPHILS NFR BLD AUTO: 0 % — SIGNIFICANT CHANGE UP (ref 0–2)
BILIRUB SERPL-MCNC: 0.7 MG/DL — SIGNIFICANT CHANGE UP (ref 0.2–1.2)
BUN SERPL-MCNC: 20 MG/DL — SIGNIFICANT CHANGE UP (ref 7–23)
CALCIUM SERPL-MCNC: 8.8 MG/DL — SIGNIFICANT CHANGE UP (ref 8.5–10.1)
CHLORIDE SERPL-SCNC: 109 MMOL/L — HIGH (ref 96–108)
CO2 SERPL-SCNC: 26 MMOL/L — SIGNIFICANT CHANGE UP (ref 22–31)
CREAT SERPL-MCNC: 0.8 MG/DL — SIGNIFICANT CHANGE UP (ref 0.5–1.3)
EGFR: 86 ML/MIN/1.73M2 — SIGNIFICANT CHANGE UP
EOSINOPHIL # BLD AUTO: 0 K/UL — SIGNIFICANT CHANGE UP (ref 0–0.5)
EOSINOPHIL NFR BLD AUTO: 0 % — SIGNIFICANT CHANGE UP (ref 0–6)
GLUCOSE SERPL-MCNC: 154 MG/DL — HIGH (ref 70–99)
HCT VFR BLD CALC: 43.6 % — SIGNIFICANT CHANGE UP (ref 39–50)
HGB BLD-MCNC: 14.5 G/DL — SIGNIFICANT CHANGE UP (ref 13–17)
IMM GRANULOCYTES NFR BLD AUTO: 1.3 % — HIGH (ref 0–0.9)
LYMPHOCYTES # BLD AUTO: 0.53 K/UL — LOW (ref 1–3.3)
LYMPHOCYTES # BLD AUTO: 6.7 % — LOW (ref 13–44)
MCHC RBC-ENTMCNC: 31.1 PG — SIGNIFICANT CHANGE UP (ref 27–34)
MCHC RBC-ENTMCNC: 33.3 GM/DL — SIGNIFICANT CHANGE UP (ref 32–36)
MCV RBC AUTO: 93.6 FL — SIGNIFICANT CHANGE UP (ref 80–100)
MONOCYTES # BLD AUTO: 0.26 K/UL — SIGNIFICANT CHANGE UP (ref 0–0.9)
MONOCYTES NFR BLD AUTO: 3.3 % — SIGNIFICANT CHANGE UP (ref 2–14)
MRSA PCR RESULT.: SIGNIFICANT CHANGE UP
NEUTROPHILS # BLD AUTO: 7.03 K/UL — SIGNIFICANT CHANGE UP (ref 1.8–7.4)
NEUTROPHILS NFR BLD AUTO: 88.7 % — HIGH (ref 43–77)
NRBC # BLD: 0 /100 WBCS — SIGNIFICANT CHANGE UP (ref 0–0)
PLATELET # BLD AUTO: 230 K/UL — SIGNIFICANT CHANGE UP (ref 150–400)
POTASSIUM SERPL-MCNC: 3.6 MMOL/L — SIGNIFICANT CHANGE UP (ref 3.5–5.3)
POTASSIUM SERPL-SCNC: 3.6 MMOL/L — SIGNIFICANT CHANGE UP (ref 3.5–5.3)
PROT SERPL-MCNC: 6.8 G/DL — SIGNIFICANT CHANGE UP (ref 6–8.3)
RBC # BLD: 4.66 M/UL — SIGNIFICANT CHANGE UP (ref 4.2–5.8)
RBC # FLD: 13.5 % — SIGNIFICANT CHANGE UP (ref 10.3–14.5)
S AUREUS DNA NOSE QL NAA+PROBE: SIGNIFICANT CHANGE UP
SODIUM SERPL-SCNC: 142 MMOL/L — SIGNIFICANT CHANGE UP (ref 135–145)
WBC # BLD: 7.92 K/UL — SIGNIFICANT CHANGE UP (ref 3.8–10.5)
WBC # FLD AUTO: 7.92 K/UL — SIGNIFICANT CHANGE UP (ref 3.8–10.5)

## 2022-11-30 PROCEDURE — 99233 SBSQ HOSP IP/OBS HIGH 50: CPT | Mod: GC

## 2022-11-30 RX ADMIN — CEFTRIAXONE 100 MILLIGRAM(S): 500 INJECTION, POWDER, FOR SOLUTION INTRAMUSCULAR; INTRAVENOUS at 06:26

## 2022-11-30 RX ADMIN — FINASTERIDE 5 MILLIGRAM(S): 5 TABLET, FILM COATED ORAL at 11:26

## 2022-11-30 RX ADMIN — POLYETHYLENE GLYCOL 3350 17 GRAM(S): 17 POWDER, FOR SOLUTION ORAL at 11:26

## 2022-11-30 RX ADMIN — PANTOPRAZOLE SODIUM 40 MILLIGRAM(S): 20 TABLET, DELAYED RELEASE ORAL at 11:26

## 2022-11-30 RX ADMIN — TAMSULOSIN HYDROCHLORIDE 0.4 MILLIGRAM(S): 0.4 CAPSULE ORAL at 22:13

## 2022-11-30 RX ADMIN — ATORVASTATIN CALCIUM 20 MILLIGRAM(S): 80 TABLET, FILM COATED ORAL at 22:13

## 2022-11-30 RX ADMIN — Medication 25 MILLIGRAM(S): at 18:32

## 2022-11-30 RX ADMIN — REMDESIVIR 500 MILLIGRAM(S): 5 INJECTION INTRAVENOUS at 10:38

## 2022-11-30 RX ADMIN — APIXABAN 5 MILLIGRAM(S): 2.5 TABLET, FILM COATED ORAL at 06:27

## 2022-11-30 RX ADMIN — APIXABAN 5 MILLIGRAM(S): 2.5 TABLET, FILM COATED ORAL at 18:32

## 2022-11-30 RX ADMIN — Medication 6 MILLIGRAM(S): at 06:27

## 2022-11-30 RX ADMIN — Medication 25 MILLIGRAM(S): at 06:27

## 2022-11-30 NOTE — PROGRESS NOTE ADULT - PROBLEM SELECTOR PLAN 5
- Previously followed Dr. Green  - W/ history of repeated UTI, prostatitis, urinary retention requiring [place] cath  - Continue home tamsulosin w/ hold parameters, and Dutasteride therapeutic interchange.
- Previously followed Dr. Green  - W/ history of repeated UTI, prostatitis, urinary retention requiring [place] cath  - Continue home tamsulosin w/ hold parameters, and Dutasteride therapeutic interchange.

## 2022-11-30 NOTE — PROGRESS NOTE ADULT - SUBJECTIVE AND OBJECTIVE BOX
Patient is a 86y old  Male who presents with a chief complaint of covid (30 Nov 2022 13:13)      INTERVAL HPI/OVERNIGHT EVENTS: Pt was seen and examined at bedside. No acute overnight events. Pt states that he feels much improved today, tolerating room air.  Pt denies headache, dizziness, lightheadedness, fever, chills, body aches, CP, SOB,  n/v, numbness/tingling.  No other complaints at this time.     MEDICATIONS  (STANDING):  apixaban 5 milliGRAM(s) Oral two times a day  atorvastatin 20 milliGRAM(s) Oral at bedtime  cefTRIAXone   IVPB 1000 milliGRAM(s) IV Intermittent every 24 hours  dexAMETHasone     Tablet 6 milliGRAM(s) Oral daily  finasteride 5 milliGRAM(s) Oral daily  metoprolol succinate ER 25 milliGRAM(s) Oral every 12 hours  pantoprazole    Tablet 40 milliGRAM(s) Oral daily  polyethylene glycol 3350 17 Gram(s) Oral daily  remdesivir  IVPB   IV Intermittent   remdesivir  IVPB 100 milliGRAM(s) IV Intermittent every 24 hours  tamsulosin 0.4 milliGRAM(s) Oral at bedtime    MEDICATIONS  (PRN):  acetaminophen     Tablet .. 650 milliGRAM(s) Oral every 6 hours PRN Temp greater or equal to 38C (100.4F), Mild Pain (1 - 3)  albuterol    90 MICROgram(s) HFA Inhaler 2 Puff(s) Inhalation every 6 hours PRN Shortness of Breath and/or Wheezing  aluminum hydroxide/magnesium hydroxide/simethicone Suspension 30 milliLiter(s) Oral every 4 hours PRN Dyspepsia  guaiFENesin Oral Liquid (Sugar-Free) 200 milliGRAM(s) Oral every 6 hours PRN Cough  melatonin 3 milliGRAM(s) Oral at bedtime PRN Insomnia  ondansetron Injectable 4 milliGRAM(s) IV Push every 8 hours PRN Nausea and/or Vomiting      Allergies    No Known Allergies    Intolerances        REVIEW OF SYSTEMS:  CONSTITUTIONAL: No fever or chills  HEENT:  No headache, no sore throat  RESPIRATORY: No cough, wheezing, or shortness of breath  CARDIOVASCULAR: No chest pain, palpitations  GASTROINTESTINAL: No abd pain, nausea, vomiting, or diarrhea  GENITOURINARY: No dysuria, frequency, or hematuria  NEUROLOGICAL: no focal weakness or dizziness  MUSCULOSKELETAL: no myalgias     Vital Signs Last 24 Hrs  T(C): 36.9 (30 Nov 2022 11:34), Max: 36.9 (30 Nov 2022 11:34)  T(F): 98.5 (30 Nov 2022 11:34), Max: 98.5 (30 Nov 2022 11:34)  HR: 55 (30 Nov 2022 11:34) (55 - 72)  BP: 130/54 (30 Nov 2022 11:34) (118/61 - 130/60)  BP(mean): --  RR: 18 (30 Nov 2022 11:34) (18 - 18)  SpO2: 94% (30 Nov 2022 11:34) (93% - 95%)    Parameters below as of 30 Nov 2022 11:34  Patient On (Oxygen Delivery Method): room air      PHYSICAL EXAM:  GENERAL: NAD  HEENT:  anicteric, moist mucous membranes  CHEST/LUNG:  Left lower lung field coarse breath sounds, rest generally CTA though exam limited with isolation room stethoscope   HEART:  RRR, S1, S2  ABDOMEN:  BS+, soft, nontender, nondistended  EXTREMITIES: no edema, cyanosis, or calf tenderness  NERVOUS SYSTEM: answers questions and follows commands appropriately      LABS:                        14.5   7.92  )-----------( 230      ( 30 Nov 2022 09:08 )             43.6     CBC Full  -  ( 30 Nov 2022 09:08 )  WBC Count : 7.92 K/uL  Hemoglobin : 14.5 g/dL  Hematocrit : 43.6 %  Platelet Count - Automated : 230 K/uL  Mean Cell Volume : 93.6 fl  Mean Cell Hemoglobin : 31.1 pg  Mean Cell Hemoglobin Concentration : 33.3 gm/dL  Auto Neutrophil # : 7.03 K/uL  Auto Lymphocyte # : 0.53 K/uL  Auto Monocyte # : 0.26 K/uL  Auto Eosinophil # : 0.00 K/uL  Auto Basophil # : 0.00 K/uL  Auto Neutrophil % : 88.7 %  Auto Lymphocyte % : 6.7 %  Auto Monocyte % : 3.3 %  Auto Eosinophil % : 0.0 %  Auto Basophil % : 0.0 %    30 Nov 2022 09:08    142    |  109    |  20     ----------------------------<  154    3.6     |  26     |  0.80     Ca    8.8        30 Nov 2022 09:08    TPro  6.8    /  Alb  2.9    /  TBili  0.7    /  DBili  x      /  AST  29     /  ALT  45     /  AlkPhos  81     30 Nov 2022 09:08        CAPILLARY BLOOD GLUCOSE            Culture - Blood (collected 11-27-22 @ 06:15)  Source: .Blood Blood-Peripheral  Preliminary Report (11-28-22 @ 12:01):    No growth to date.    Culture - Urine (collected 11-27-22 @ 06:00)  Source: Clean Catch Clean Catch (Midstream)  Final Report (11-28-22 @ 12:12):    <10,000 CFU/mL Normal Urogenital Erlinda    Culture - Blood (collected 11-27-22 @ 06:00)  Source: .Blood Blood-Peripheral  Preliminary Report (11-28-22 @ 12:01):    No growth to date.        RADIOLOGY & ADDITIONAL TESTS: ____    Personally reviewed.     Consultant(s) Notes Reviewed:  [x] YES  [ ] NO     Patient is a 86y old  Male who presents with a chief complaint of covid (30 Nov 2022 13:13)      INTERVAL HPI/OVERNIGHT EVENTS: Pt was seen and examined at bedside. No acute overnight events. Pt states that he feels much improved today, tolerating room air.  Pt denies headache, dizziness, lightheadedness, fever, chills, body aches, CP, SOB,  n/v, numbness/tingling.  No other complaints at this time.             REVIEW OF SYSTEMS:  CONSTITUTIONAL: No fever or chills  HEENT:  No headache, no sore throat  RESPIRATORY: No cough, wheezing, or shortness of breath  CARDIOVASCULAR: No chest pain, palpitations  GASTROINTESTINAL: No abd pain, nausea, vomiting, or diarrhea  GENITOURINARY: No dysuria, frequency, or hematuria  NEUROLOGICAL: no focal weakness or dizziness  MUSCULOSKELETAL: no myalgias     Vital Signs Last 24 Hrs  T(C): 36.9 (30 Nov 2022 11:34), Max: 36.9 (30 Nov 2022 11:34)  T(F): 98.5 (30 Nov 2022 11:34), Max: 98.5 (30 Nov 2022 11:34)  HR: 55 (30 Nov 2022 11:34) (55 - 72)  BP: 130/54 (30 Nov 2022 11:34) (118/61 - 130/60)  BP(mean): --  RR: 18 (30 Nov 2022 11:34) (18 - 18)  SpO2: 94% (30 Nov 2022 11:34) (93% - 95%)    Parameters below as of 30 Nov 2022 11:34  Patient On (Oxygen Delivery Method): room air      PHYSICAL EXAM:  GENERAL: NAD  HEENT:  anicteric, moist mucous membranes  CHEST/LUNG:  Left lower lung field coarse breath sounds, rest generally CTA though exam limited with isolation room stethoscope   HEART:  RRR, S1, S2 , no tachy   ABDOMEN:  BS+, soft, nontender, nondistended  EXTREMITIES: no edema, cyanosis, or calf tenderness  NERVOUS SYSTEM: answers questions and follows commands appropriately  gu intact      MEDICATIONS  (STANDING):  apixaban 5 milliGRAM(s) Oral two times a day  atorvastatin 20 milliGRAM(s) Oral at bedtime  cefTRIAXone   IVPB 1000 milliGRAM(s) IV Intermittent every 24 hours  dexAMETHasone     Tablet 6 milliGRAM(s) Oral daily  finasteride 5 milliGRAM(s) Oral daily  metoprolol succinate ER 25 milliGRAM(s) Oral every 12 hours  pantoprazole    Tablet 40 milliGRAM(s) Oral daily  polyethylene glycol 3350 17 Gram(s) Oral daily  remdesivir  IVPB   IV Intermittent   remdesivir  IVPB 100 milliGRAM(s) IV Intermittent every 24 hours  tamsulosin 0.4 milliGRAM(s) Oral at bedtime    MEDICATIONS  (PRN):  acetaminophen     Tablet .. 650 milliGRAM(s) Oral every 6 hours PRN Temp greater or equal to 38C (100.4F), Mild Pain (1 - 3)  albuterol    90 MICROgram(s) HFA Inhaler 2 Puff(s) Inhalation every 6 hours PRN Shortness of Breath and/or Wheezing  aluminum hydroxide/magnesium hydroxide/simethicone Suspension 30 milliLiter(s) Oral every 4 hours PRN Dyspepsia  guaiFENesin Oral Liquid (Sugar-Free) 200 milliGRAM(s) Oral every 6 hours PRN Cough  melatonin 3 milliGRAM(s) Oral at bedtime PRN Insomnia  ondansetron Injectable 4 milliGRAM(s) IV Push every 8 hours PRN Nausea and/or Vomiting      Allergies    No Known Allergies    Intolerances    LABS:                        14.5   7.92  )-----------( 230      ( 30 Nov 2022 09:08 )             43.6     CBC Full  -  ( 30 Nov 2022 09:08 )  WBC Count : 7.92 K/uL  Hemoglobin : 14.5 g/dL  Hematocrit : 43.6 %  Platelet Count - Automated : 230 K/uL  Mean Cell Volume : 93.6 fl  Mean Cell Hemoglobin : 31.1 pg  Mean Cell Hemoglobin Concentration : 33.3 gm/dL  Auto Neutrophil # : 7.03 K/uL  Auto Lymphocyte # : 0.53 K/uL  Auto Monocyte # : 0.26 K/uL  Auto Eosinophil # : 0.00 K/uL  Auto Basophil # : 0.00 K/uL  Auto Neutrophil % : 88.7 %  Auto Lymphocyte % : 6.7 %  Auto Monocyte % : 3.3 %  Auto Eosinophil % : 0.0 %  Auto Basophil % : 0.0 %    30 Nov 2022 09:08    142    |  109    |  20     ----------------------------<  154    3.6     |  26     |  0.80     Ca    8.8        30 Nov 2022 09:08    TPro  6.8    /  Alb  2.9    /  TBili  0.7    /  DBili  x      /  AST  29     /  ALT  45     /  AlkPhos  81     30 Nov 2022 09:08        CAPILLARY BLOOD GLUCOSE            Culture - Blood (collected 11-27-22 @ 06:15)  Source: .Blood Blood-Peripheral  Preliminary Report (11-28-22 @ 12:01):    No growth to date.    Culture - Urine (collected 11-27-22 @ 06:00)  Source: Clean Catch Clean Catch (Midstream)  Final Report (11-28-22 @ 12:12):    <10,000 CFU/mL Normal Urogenital Erlinda    Culture - Blood (collected 11-27-22 @ 06:00)  Source: .Blood Blood-Peripheral  Preliminary Report (11-28-22 @ 12:01):    No growth to date.        RADIOLOGY & ADDITIONAL TESTS: ____    Personally reviewed.     Consultant(s) Notes Reviewed:  [x] YES  [ ] NO

## 2022-11-30 NOTE — PROGRESS NOTE ADULT - PROBLEM SELECTOR PLAN 1
- Pt symptomatic, febrile w/ leukocytosis and bandemia, tachycardia on admission w/ CXR findings of PNA--meets sepsis criteria (POA)  - S/p 2.4L NC, ceftriaxone, azithromycin in ED  - 11/27 CXR: There is an irregular left mid lateral infiltrate and possibly a right base infiltrate.  - 11/27 CT with multifocal pneumonia   - Found to be COVID+, RVP negative otherwise  - Continue ceftriaxone for suspected superimposed bacterial pneumonia. Per Dr. Barraza rec, cefuroxime 500mg BID with last day 12/1 when pt DC'd home.   - s/p azithromycin with Legionella neg  - F/u BCx x2, MRSA PCR, Strep antigen  - Continue Remdesivir 4/5 day and Dexamethasone for COVID  - Satting well on 2L NC; Continue O2 support, wean off if able  - Monitor for signs and symptoms of worsening infection, SOB, O2 desaturation  - Tylenol PRN for fever  - Pulmonology (Dr. Nunez), recs appreciated  - ID Dr. Barraza, recs appreciated - Pt symptomatic, febrile w/ leukocytosis and bandemia, tachycardia on admission w/ CXR findings of PNA--meets sepsis criteria (POA)  - S/p 2.4L NC, ceftriaxone, azithromycin in ED  - 11/27 CXR: There is an irregular left mid lateral infiltrate and possibly a right base infiltrate.  - 11/27 CT with multifocal pneumonia   - Found to be COVID+, RVP negative otherwise  - Continue ceftriaxone for suspected superimposed bacterial pneumonia. Per Dr. Barraza rec, cefuroxime 500mg BID with last day 12/1 when pt DC'd home.   - s/p azithromycin with Legionella neg  - F/u BCx x2, MRSA PCR, Strep antigen  - Continue Remdesivir 4/5 day and Dexamethasone for COVID  - Satting well on RA  - Monitor for signs and symptoms of worsening infection, SOB, O2 desaturation  - Tylenol PRN for fever  - Pulmonology (Dr. Nunez), recs appreciated  - ID Dr. Barraza, recs appreciated - Pt symptomatic, febrile w/ leukocytosis and bandemia, tachycardia on admission w/ CXR findings of PNA--meets sepsis criteria (POA)  - S/p 2.4L NC, ceftriaxone, azithromycin in ED  - 11/27 CXR: There is an irregular left mid lateral infiltrate and possibly a right base infiltrate.  - 11/27 CT with multifocal pneumonia   - Found to be COVID+, RVP negative otherwise  - Continue ceftriaxone for suspected superimposed bacterial pneumonia. Per Dr. Barraza rec, cefuroxime 500mg BID with last day 12/1 when pt DC'd home.   - s/p azithromycin with Legionella neg  - F/u BCx x2, MRSA PCR, Strep antigen  - Continue Remdesivir 3/5 day and Dexamethasone for COVID 19   - Satting well on RA  - Monitor for signs and symptoms of worsening infection, SOB, O2 desaturation  - Tylenol PRN for fever  - Pulmonology (Dr. Nunez), recs appreciated  - ID Dr. Barraza, recs appreciated

## 2022-11-30 NOTE — PROGRESS NOTE ADULT - PROBLEM SELECTOR PLAN 3
W/ gross hematuria prior to presentation, UA positive for gross blood, >50RBC, 30 protein  No gross hematuria observed on exam- dried blood noted on meatus  - No gross hematuria observed 11/29  - On Eliquis for afib  - Previously followed Dr. Green (urology)  - Patient W/ history of repeated UTI, prostatitis, urinary retention requiring [place] cath  - Discussed with urology, patient with very large prostate- likely was irritated, if patient not having difficulty urinating, and no deepika blood in urine (urine clear in urinal at bedside), can continue eliquis and monitor H/H.  - Improving
W/ gross hematuria prior to presentation, UA positive for gross blood, >50RBC, 30 protein  No gross hematuria observed on exam- dried blood noted on meatus  - No gross hematuria observed 11/29  - On Eliquis for afib  - Previously followed Dr. Green (urology)  - Patient W/ history of repeated UTI, prostatitis, urinary retention requiring [place] cath  - Discussed with urology, patient with very large prostate- likely was irritated, if patient not having difficulty urinating, and no deepika blood in urine (urine clear in urinal at bedside), can continue eliquis and monitor H/H.  - Improving

## 2022-11-30 NOTE — PROGRESS NOTE ADULT - TIME BILLING
Pt seen + examined. Case discussed with resident. Agree with assessment and plan above (edited by me in detail):  Time spent: 37min. More than 50% of the visit was spent counseling the patient on medical condition -- acute hypoxic respiratory failure and sepsis due to COVID and bacterial PNA.    85yo M with PMH of a fib on Eliquis and BPH who presents to the hospital c/o shortness of breath with purulent sputum, fever, a/w acute hypoxic respiratory failure and sepsis due to COVID and bacterial PNA.    - Pt symptomatic, febrile w/ leukocytosis and bandemia, tachycardia on admission w/ CXR findings of PNA--meets sepsis criteria (POA)  - S/p 2.4L NC, ceftriaxone, azithromycin in ED  - 11/27 CXR: There is an irregular left mid lateral infiltrate and possibly a right base infiltrate.  - 11/27 CT with multifocal pneumonia   - Found to be COVID+, RVP negative otherwise  - Continue ceftriaxone for suspected superimposed bacterial pneumonia   - s/p azithromycin with Legionella neg  - F/u BCx x2, MRSA PCR, Strep antigen  - Continue Remdesivir (last dose on 12/1) and Dexamethasone for COVID  - Satting well on 2L NC; Continue O2 support, wean off if able  - Monitor for signs and symptoms of worsening infection, SOB, O2 desaturation  - Tylenol PRN for fever  - Pulmonology (Dr. Nunez), recs appreciated  - ID Dr. Barraza, recs appreciated
pt seen and examine today see above plan - covid 19 pna - Continue Remdesivir 3/5 day and Dexamethasone  on iv abx ceftriaxone for suspected superimposed bacterial pneumonia- s/p azithromycin  BCx x2  neg  ,Strep antigen neg , Legionella neg ,

## 2022-11-30 NOTE — PROGRESS NOTE ADULT - ASSESSMENT
85yo M with PMH of a fib on Eliquis and BPH who presents to the hospital c/o shortness of breath with purulent sputum, fever, a/w acute hypoxic respiratory failure and sepsis due to COVID and bacterial PNA.

## 2022-11-30 NOTE — PROGRESS NOTE ADULT - PROBLEM SELECTOR PLAN 2
- Pt meeting sepsis criteria on admission  - Satting well on 2L NC  - CXR as above  - 1 day since symptom onset   - maintain isolation precautions - airborne and contact  - monitor for signs and symptoms of worsening infection, SOB, O2 desaturation  - Continue Remdesivir and Dexamethasone    - ID Dr. Barraza, recs appreciated - Pt meeting sepsis criteria on admission  - Satting well on RA  - CXR as above  - 2 day since symptom onset   - maintain isolation precautions - airborne and contact  - monitor for signs and symptoms of worsening infection, SOB, O2 desaturation  - Continue Remdesivir 4/5 day and Dexamethasone    - ID Dr. Barraza, recs appreciated - Pt meeting sepsis criteria on admission  - Satting well on RA  - CXR as above  - 2 day since symptom onset   - maintain isolation precautions - airborne and contact  - monitor for signs and symptoms of worsening infection, SOB, O2 desaturation  - Continue Remdesivir 3/5 day and Dexamethasone    - ID Dr. Barraza, recs appreciated

## 2022-11-30 NOTE — PROGRESS NOTE ADULT - SUBJECTIVE AND OBJECTIVE BOX
[facility]FECTIOUS DISE[facility]  Umair Barraza MD PhD, Sandra Hall MD, Mary Malcolm MD, Breonna Hannon MD, Steve Guy MD  and providing coverage with Gerard Arrington MD  Providing Infectious Disease Consultations at [facility], [facility], [place], [facility], [facility]'s    Office# [phone] to schedule follow up appointments  Answering Service for urgent calls or New Consults [phone]  Cell# to text for urgent issues Umair Barraza [phone]     infectious diseases progress note:    ZENAIDA CORADO is a 86y y. o. Male patient    Overnight and events of the last 24hrs reviewed    Allergies    No Known Allergies    Intolerances        ANTIBIOTICS/RELEVANT:  antimicrobials  cefTRIAXone   IVPB 1000 milliGRAM(s) IV Intermittent every 24 hours  remdesivir  IVPB   IV Intermittent   remdesivir  IVPB 100 milliGRAM(s) IV Intermittent every 24 hours    immunologic:    OTHER:  acetaminophen     Tablet .. 650 milliGRAM(s) Oral every 6 hours PRN  albuterol    90 MICROgram(s) HFA Inhaler 2 Puff(s) Inhalation every 6 hours PRN  aluminum hydroxide/magnesium hydroxide/simethicone Suspension 30 milliLiter(s) Oral every 4 hours PRN  apixaban 5 milliGRAM(s) Oral two times a day  atorvastatin 20 milliGRAM(s) Oral at bedtime  dexAMETHasone     Tablet 6 milliGRAM(s) Oral daily  finasteride 5 milliGRAM(s) Oral daily  guaiFENesin Oral Liquid (Sugar-Free) 200 milliGRAM(s) Oral every 6 hours PRN  melatonin 3 milliGRAM(s) Oral at bedtime PRN  metoprolol succinate ER 25 milliGRAM(s) Oral every 12 hours  ondansetron Injectable 4 milliGRAM(s) IV Push every 8 hours PRN  pantoprazole    Tablet 40 milliGRAM(s) Oral daily  polyethylene glycol 3350 17 Gram(s) Oral daily  tamsulosin 0.4 milliGRAM(s) Oral at bedtime      Objective:  Vital Signs Last 24 Hrs  T(C): 36.9 (30 Nov 2022 11:34), Max: 36.9 (30 Nov 2022 11:34)  T(F): 98.5 (30 Nov 2022 11:34), Max: 98.5 (30 Nov 2022 11:34)  HR: 55 (30 Nov 2022 11:34) (55 - 72)  BP: 130/54 (30 Nov 2022 11:34) (118/61 - 130/60)  BP(mean): --  RR: 18 (30 Nov 2022 11:34) (18 - 18)  SpO2: 94% (30 Nov 2022 11:34) (93% - 95%)    Parameters below as of 30 Nov 2022 11:34  Patient On (Oxygen Delivery Method): room air        T(C): 36.9 (11-30-22 @ 11:34), Max: 37 (11-28-22 @ 13:47)  T(C): 36.9 (11-30-22 @ 11:34), Max: 38.8 (11-27-22 @ 17:16)  T(C): 36.9 (11-30-22 @ 11:34), Max: 38.8 (11-27-22 @ 04:42)    PHYSICAL EXAM:  HEENT: NC atraumatic  Neck: supple  Respiratory: no accessory muscle use, breathing comfortably  Cardiovascular: distant  Gastrointestinal: normal appearing, nondistended  Extremities: no clubbing, no cyanosis,        LABS:                          14.5   7.92  )-----------( 230      ( 30 Nov 2022 09:08 )             43.6       WBC  7.92 11-30 @ 09:08  11.19 11-29 @ 09:12  12.65 11-28 @ 07:30  10.52 11-27 @ 06:00      11-30    142  |  109<H>  |  20  ----------------------------<  154<H>  3.6   |  26  |  0.80    Ca    8.8      30 Nov 2022 09:08  Mg     2.0     11-29    TPro  6.8  /  Alb  2.9<L>  /  TBili  0.7  /  DBili  x   /  AST  29  /  ALT  45  /  AlkPhos  81  11-30      Creatinine, Serum: 0.80 mg/dL (11-30-22 @ 09:08)  Creatinine, Serum: 0.74 mg/dL (11-29-22 @ 09:12)  Creatinine, Serum: 0.83 mg/dL (11-27-22 @ 06:00)                INFLAMMATORY MARKERS      MICROBIOLOGY:              RADIOLOGY & ADDITIONAL STUDIES:

## 2022-11-30 NOTE — PROGRESS NOTE ADULT - SUBJECTIVE AND OBJECTIVE BOX
Date/Time Patient Seen:  		  Referring MD:   Data Reviewed	       Patient is a 86y old  Male who presents with a chief complaint of COVID  (29 Nov 2022 13:54)      Subjective/HPI     PAST MEDICAL & SURGICAL HISTORY:  Afib    BPH (benign prostatic hyperplasia)    History of hypotension          Medication list         MEDICATIONS  (STANDING):  apixaban 5 milliGRAM(s) Oral two times a day  atorvastatin 20 milliGRAM(s) Oral at bedtime  cefTRIAXone   IVPB 1000 milliGRAM(s) IV Intermittent every 24 hours  dexAMETHasone     Tablet 6 milliGRAM(s) Oral daily  finasteride 5 milliGRAM(s) Oral daily  metoprolol succinate ER 25 milliGRAM(s) Oral every 12 hours  pantoprazole    Tablet 40 milliGRAM(s) Oral daily  polyethylene glycol 3350 17 Gram(s) Oral daily  remdesivir  IVPB   IV Intermittent   remdesivir  IVPB 100 milliGRAM(s) IV Intermittent every 24 hours  tamsulosin 0.4 milliGRAM(s) Oral at bedtime    MEDICATIONS  (PRN):  acetaminophen     Tablet .. 650 milliGRAM(s) Oral every 6 hours PRN Temp greater or equal to 38C (100.4F), Mild Pain (1 - 3)  albuterol    90 MICROgram(s) HFA Inhaler 2 Puff(s) Inhalation every 6 hours PRN Shortness of Breath and/or Wheezing  aluminum hydroxide/magnesium hydroxide/simethicone Suspension 30 milliLiter(s) Oral every 4 hours PRN Dyspepsia  guaiFENesin Oral Liquid (Sugar-Free) 200 milliGRAM(s) Oral every 6 hours PRN Cough  melatonin 3 milliGRAM(s) Oral at bedtime PRN Insomnia  ondansetron Injectable 4 milliGRAM(s) IV Push every 8 hours PRN Nausea and/or Vomiting         Vitals log        ICU Vital Signs Last 24 Hrs  T(C): 36.7 (30 Nov 2022 05:07), Max: 36.8 (29 Nov 2022 11:19)  T(F): 98.1 (30 Nov 2022 05:07), Max: 98.3 (29 Nov 2022 11:19)  HR: 60 (30 Nov 2022 05:07) (60 - 72)  BP: 118/61 (30 Nov 2022 05:07) (118/61 - 130/60)  BP(mean): --  ABP: --  ABP(mean): --  RR: 18 (30 Nov 2022 05:07) (18 - 18)  SpO2: 93% (30 Nov 2022 05:07) (93% - 95%)    O2 Parameters below as of 30 Nov 2022 05:07  Patient On (Oxygen Delivery Method): room air                 Input and Output:  I&O's Detail    28 Nov 2022 07:01  -  29 Nov 2022 07:00  --------------------------------------------------------  IN:  Total IN: 0 mL    OUT:    Voided (mL): 800 mL  Total OUT: 800 mL    Total NET: -800 mL      29 Nov 2022 07:01  -  30 Nov 2022 06:14  --------------------------------------------------------  IN:  Total IN: 0 mL    OUT:    Voided (mL): 1150 mL  Total OUT: 1150 mL    Total NET: -1150 mL          Lab Data                        13.9   11.19 )-----------( 194      ( 29 Nov 2022 09:12 )             41.5     11-29    143  |  110<H>  |  13  ----------------------------<  139<H>  3.7   |  27  |  0.74    Ca    8.6      29 Nov 2022 09:12  Mg     2.0     11-29    TPro  6.6  /  Alb  2.9<L>  /  TBili  0.6  /  DBili  x   /  AST  38<H>  /  ALT  52  /  AlkPhos  84  11-29            Review of Systems	      Objective     Physical Examination    heart s1s2  lung dec BS  head nc  head at      Pertinent Lab findings & Imaging      Cassy:  NO   Adequate UO     I&O's Detail    28 Nov 2022 07:01  -  29 Nov 2022 07:00  --------------------------------------------------------  IN:  Total IN: 0 mL    OUT:    Voided (mL): 800 mL  Total OUT: 800 mL    Total NET: -800 mL      29 Nov 2022 07:01  -  30 Nov 2022 06:14  --------------------------------------------------------  IN:  Total IN: 0 mL    OUT:    Voided (mL): 1150 mL  Total OUT: 1150 mL    Total NET: -1150 mL               Discussed with:     Cultures:	        Radiology

## 2022-11-30 NOTE — PROGRESS NOTE ADULT - ASSESSMENT
86y Male complaining of shortness of breath - fever - chills - dyspnea - cough -     BPH  AF  eval for Lower resp tract infection  hx of Asbestos exposure  Hematuria  OA  COVID with HYPOXEMIA      strep and legionella ag neg  on ABX  on remdesivir - decadron -   vs noted  cm follow up noted    D dimer  DVT p - pt is on ELIQUIS  [facility] rx regimen and BP control  Remdesivir and Decadron for COVID with Hypoxemia  monitor VS and HD  keep sat > 88 pct  ACAP and Robitussin PRN for sx management  isolation precs  nutrition  old records reviewed  spoke with pt - wife -

## 2022-11-30 NOTE — PROGRESS NOTE ADULT - PROBLEM SELECTOR PLAN 7
DVT prophylaxis -- Patient is on Eliquis    Dispo: Likely home once stable.
DVT prophylaxis -- Patient is on Eliquis    Dispo: Likely home once stable.

## 2022-11-30 NOTE — PROGRESS NOTE ADULT - PROBLEM SELECTOR PLAN 4
Patient with chronic asymptomatic atrial fibrillation  - Rate Control: Metoprolol succinate ER 25mg BID  - Anticoagulation: On Eliquis 5mg BID  - Monitor and replete electrolytes   - K 3.3 on admission, replete  - K 3.7 11/29, replete
Patient with chronic asymptomatic atrial fibrillation  - Rate Control: Metoprolol succinate ER 25mg BID  - Anticoagulation: On Eliquis 5mg BID  - Monitor and replete electrolytes   - K 3.3 on admission, replete  - K 3.7 11/29, replete

## 2022-12-01 ENCOUNTER — TRANSCRIPTION ENCOUNTER (OUTPATIENT)
Age: 87
End: 2022-12-01

## 2022-12-01 VITALS
DIASTOLIC BLOOD PRESSURE: 72 MMHG | SYSTOLIC BLOOD PRESSURE: 148 MMHG | HEART RATE: 82 BPM | OXYGEN SATURATION: 93 % | RESPIRATION RATE: 18 BRPM | TEMPERATURE: 98 F

## 2022-12-01 LAB
ALBUMIN SERPL ELPH-MCNC: 2.5 G/DL — LOW (ref 3.3–5)
ALP SERPL-CCNC: 66 U/L — SIGNIFICANT CHANGE UP (ref 40–120)
ALT FLD-CCNC: 63 U/L — SIGNIFICANT CHANGE UP (ref 12–78)
ANION GAP SERPL CALC-SCNC: 8 MMOL/L — SIGNIFICANT CHANGE UP (ref 5–17)
AST SERPL-CCNC: 48 U/L — HIGH (ref 15–37)
BASOPHILS # BLD AUTO: 0 K/UL — SIGNIFICANT CHANGE UP (ref 0–0.2)
BASOPHILS NFR BLD AUTO: 0 % — SIGNIFICANT CHANGE UP (ref 0–2)
BILIRUB SERPL-MCNC: 0.7 MG/DL — SIGNIFICANT CHANGE UP (ref 0.2–1.2)
BUN SERPL-MCNC: 19 MG/DL — SIGNIFICANT CHANGE UP (ref 7–23)
CALCIUM SERPL-MCNC: 8.6 MG/DL — SIGNIFICANT CHANGE UP (ref 8.5–10.1)
CHLORIDE SERPL-SCNC: 110 MMOL/L — HIGH (ref 96–108)
CO2 SERPL-SCNC: 27 MMOL/L — SIGNIFICANT CHANGE UP (ref 22–31)
CREAT SERPL-MCNC: 0.69 MG/DL — SIGNIFICANT CHANGE UP (ref 0.5–1.3)
EGFR: 90 ML/MIN/1.73M2 — SIGNIFICANT CHANGE UP
EOSINOPHIL # BLD AUTO: 0 K/UL — SIGNIFICANT CHANGE UP (ref 0–0.5)
EOSINOPHIL NFR BLD AUTO: 0 % — SIGNIFICANT CHANGE UP (ref 0–6)
GLUCOSE SERPL-MCNC: 99 MG/DL — SIGNIFICANT CHANGE UP (ref 70–99)
HCT VFR BLD CALC: 38.2 % — LOW (ref 39–50)
HGB BLD-MCNC: 12.8 G/DL — LOW (ref 13–17)
IMM GRANULOCYTES NFR BLD AUTO: 0.7 % — SIGNIFICANT CHANGE UP (ref 0–0.9)
LYMPHOCYTES # BLD AUTO: 0.97 K/UL — LOW (ref 1–3.3)
LYMPHOCYTES # BLD AUTO: 17.8 % — SIGNIFICANT CHANGE UP (ref 13–44)
MCHC RBC-ENTMCNC: 31.2 PG — SIGNIFICANT CHANGE UP (ref 27–34)
MCHC RBC-ENTMCNC: 33.5 GM/DL — SIGNIFICANT CHANGE UP (ref 32–36)
MCV RBC AUTO: 93.2 FL — SIGNIFICANT CHANGE UP (ref 80–100)
MONOCYTES # BLD AUTO: 0.54 K/UL — SIGNIFICANT CHANGE UP (ref 0–0.9)
MONOCYTES NFR BLD AUTO: 9.9 % — SIGNIFICANT CHANGE UP (ref 2–14)
NEUTROPHILS # BLD AUTO: 3.9 K/UL — SIGNIFICANT CHANGE UP (ref 1.8–7.4)
NEUTROPHILS NFR BLD AUTO: 71.6 % — SIGNIFICANT CHANGE UP (ref 43–77)
NRBC # BLD: 0 /100 WBCS — SIGNIFICANT CHANGE UP (ref 0–0)
PLATELET # BLD AUTO: 226 K/UL — SIGNIFICANT CHANGE UP (ref 150–400)
POTASSIUM SERPL-MCNC: 3.4 MMOL/L — LOW (ref 3.5–5.3)
POTASSIUM SERPL-SCNC: 3.4 MMOL/L — LOW (ref 3.5–5.3)
PROT SERPL-MCNC: 5.8 G/DL — LOW (ref 6–8.3)
RBC # BLD: 4.1 M/UL — LOW (ref 4.2–5.8)
RBC # FLD: 13.4 % — SIGNIFICANT CHANGE UP (ref 10.3–14.5)
SODIUM SERPL-SCNC: 145 MMOL/L — SIGNIFICANT CHANGE UP (ref 135–145)
WBC # BLD: 5.45 K/UL — SIGNIFICANT CHANGE UP (ref 3.8–10.5)
WBC # FLD AUTO: 5.45 K/UL — SIGNIFICANT CHANGE UP (ref 3.8–10.5)

## 2022-12-01 PROCEDURE — 85014 HEMATOCRIT: CPT

## 2022-12-01 PROCEDURE — 36415 COLL VENOUS BLD VENIPUNCTURE: CPT

## 2022-12-01 PROCEDURE — 81001 URINALYSIS AUTO W/SCOPE: CPT

## 2022-12-01 PROCEDURE — 83735 ASSAY OF MAGNESIUM: CPT

## 2022-12-01 PROCEDURE — 82803 BLOOD GASES ANY COMBINATION: CPT

## 2022-12-01 PROCEDURE — 87086 URINE CULTURE/COLONY COUNT: CPT

## 2022-12-01 PROCEDURE — 87899 AGENT NOS ASSAY W/OPTIC: CPT

## 2022-12-01 PROCEDURE — 87040 BLOOD CULTURE FOR BACTERIA: CPT

## 2022-12-01 PROCEDURE — 87641 MR-STAPH DNA AMP PROBE: CPT

## 2022-12-01 PROCEDURE — 85025 COMPLETE CBC W/AUTO DIFF WBC: CPT

## 2022-12-01 PROCEDURE — 93005 ELECTROCARDIOGRAM TRACING: CPT

## 2022-12-01 PROCEDURE — 83605 ASSAY OF LACTIC ACID: CPT

## 2022-12-01 PROCEDURE — 85610 PROTHROMBIN TIME: CPT

## 2022-12-01 PROCEDURE — 96374 THER/PROPH/DIAG INJ IV PUSH: CPT

## 2022-12-01 PROCEDURE — 97530 THERAPEUTIC ACTIVITIES: CPT

## 2022-12-01 PROCEDURE — 71045 X-RAY EXAM CHEST 1 VIEW: CPT

## 2022-12-01 PROCEDURE — 85018 HEMOGLOBIN: CPT

## 2022-12-01 PROCEDURE — 87637 SARSCOV2&INF A&B&RSV AMP PRB: CPT

## 2022-12-01 PROCEDURE — 85730 THROMBOPLASTIN TIME PARTIAL: CPT

## 2022-12-01 PROCEDURE — 92610 EVALUATE SWALLOWING FUNCTION: CPT

## 2022-12-01 PROCEDURE — 99239 HOSP IP/OBS DSCHRG MGMT >30: CPT | Mod: GC

## 2022-12-01 PROCEDURE — 97116 GAIT TRAINING THERAPY: CPT

## 2022-12-01 PROCEDURE — 86140 C-REACTIVE PROTEIN: CPT

## 2022-12-01 PROCEDURE — 97163 PT EVAL HIGH COMPLEX 45 MIN: CPT

## 2022-12-01 PROCEDURE — 99285 EMERGENCY DEPT VISIT HI MDM: CPT | Mod: 25

## 2022-12-01 PROCEDURE — 87449 NOS EACH ORGANISM AG IA: CPT

## 2022-12-01 PROCEDURE — 87640 STAPH A DNA AMP PROBE: CPT

## 2022-12-01 PROCEDURE — 82728 ASSAY OF FERRITIN: CPT

## 2022-12-01 PROCEDURE — 96375 TX/PRO/DX INJ NEW DRUG ADDON: CPT

## 2022-12-01 PROCEDURE — 71250 CT THORAX DX C-: CPT | Mod: MA

## 2022-12-01 PROCEDURE — 80053 COMPREHEN METABOLIC PANEL: CPT

## 2022-12-01 PROCEDURE — 80061 LIPID PANEL: CPT

## 2022-12-01 PROCEDURE — 84145 PROCALCITONIN (PCT): CPT

## 2022-12-01 PROCEDURE — 83036 HEMOGLOBIN GLYCOSYLATED A1C: CPT

## 2022-12-01 RX ORDER — FINASTERIDE 5 MG/1
1 TABLET, FILM COATED ORAL
Qty: 0 | Refills: 0 | DISCHARGE
Start: 2022-12-01

## 2022-12-01 RX ORDER — TAMSULOSIN HYDROCHLORIDE 0.4 MG/1
1 CAPSULE ORAL
Qty: 0 | Refills: 0 | DISCHARGE

## 2022-12-01 RX ORDER — POTASSIUM CHLORIDE 20 MEQ
40 PACKET (EA) ORAL ONCE
Refills: 0 | Status: COMPLETED | OUTPATIENT
Start: 2022-12-01 | End: 2022-12-01

## 2022-12-01 RX ORDER — DUTASTERIDE 0.5 MG/1
1 CAPSULE, LIQUID FILLED ORAL
Qty: 0 | Refills: 0 | DISCHARGE

## 2022-12-01 RX ORDER — ALBUTEROL 90 UG/1
2 AEROSOL, METERED ORAL
Qty: 0 | Refills: 0 | DISCHARGE
Start: 2022-12-01

## 2022-12-01 RX ORDER — APIXABAN 2.5 MG/1
1 TABLET, FILM COATED ORAL
Qty: 0 | Refills: 0 | DISCHARGE
Start: 2022-12-01

## 2022-12-01 RX ORDER — DEXAMETHASONE 0.5 MG/5ML
1 ELIXIR ORAL
Qty: 4 | Refills: 0
Start: 2022-12-01 | End: 2022-12-04

## 2022-12-01 RX ORDER — CEFUROXIME AXETIL 250 MG
0 TABLET ORAL
Qty: 0 | Refills: 0 | DISCHARGE

## 2022-12-01 RX ORDER — METOPROLOL TARTRATE 50 MG
25 TABLET ORAL
Qty: 0 | Refills: 0 | DISCHARGE

## 2022-12-01 RX ORDER — ATORVASTATIN CALCIUM 80 MG/1
1 TABLET, FILM COATED ORAL
Qty: 0 | Refills: 0 | DISCHARGE
Start: 2022-12-01

## 2022-12-01 RX ORDER — DEXAMETHASONE 0.5 MG/5ML
1 ELIXIR ORAL
Qty: 0 | Refills: 0 | DISCHARGE
Start: 2022-12-01

## 2022-12-01 RX ORDER — APIXABAN 2.5 MG/1
1 TABLET, FILM COATED ORAL
Qty: 0 | Refills: 0 | DISCHARGE

## 2022-12-01 RX ORDER — CELECOXIB 200 MG/1
0 CAPSULE ORAL
Qty: 0 | Refills: 0 | DISCHARGE

## 2022-12-01 RX ORDER — METOPROLOL TARTRATE 50 MG
1 TABLET ORAL
Qty: 0 | Refills: 0 | DISCHARGE
Start: 2022-12-01

## 2022-12-01 RX ORDER — TAMSULOSIN HYDROCHLORIDE 0.4 MG/1
1 CAPSULE ORAL
Qty: 0 | Refills: 0 | DISCHARGE
Start: 2022-12-01

## 2022-12-01 RX ADMIN — APIXABAN 5 MILLIGRAM(S): 2.5 TABLET, FILM COATED ORAL at 06:18

## 2022-12-01 RX ADMIN — FINASTERIDE 5 MILLIGRAM(S): 5 TABLET, FILM COATED ORAL at 12:14

## 2022-12-01 RX ADMIN — POLYETHYLENE GLYCOL 3350 17 GRAM(S): 17 POWDER, FOR SOLUTION ORAL at 12:14

## 2022-12-01 RX ADMIN — Medication 6 MILLIGRAM(S): at 06:18

## 2022-12-01 RX ADMIN — CEFTRIAXONE 100 MILLIGRAM(S): 500 INJECTION, POWDER, FOR SOLUTION INTRAMUSCULAR; INTRAVENOUS at 06:18

## 2022-12-01 RX ADMIN — Medication 40 MILLIEQUIVALENT(S): at 10:22

## 2022-12-01 RX ADMIN — PANTOPRAZOLE SODIUM 40 MILLIGRAM(S): 20 TABLET, DELAYED RELEASE ORAL at 12:14

## 2022-12-01 RX ADMIN — Medication 25 MILLIGRAM(S): at 06:18

## 2022-12-01 RX ADMIN — REMDESIVIR 500 MILLIGRAM(S): 5 INJECTION INTRAVENOUS at 10:22

## 2022-12-01 NOTE — DISCHARGE NOTE NURSING/CASE MANAGEMENT/SOCIAL WORK - PATIENT PORTAL LINK FT
You can access the FollowMyHealth Patient Portal offered by [facility] by registering at the following website: [email or web address]. By joining [facility] [employer]’s FollowMyHealth portal, you will also be able to view your health information using other applications (apps) compatible with our system.

## 2022-12-01 NOTE — PROGRESS NOTE ADULT - SUBJECTIVE AND OBJECTIVE BOX
Date/Time Patient Seen:  		  Referring MD:   Data Reviewed	       Patient is a 86y old  Male who presents with a chief complaint of covid (30 Nov 2022 13:13)      Subjective/HPI     PAST MEDICAL & SURGICAL HISTORY:  Afib    BPH (benign prostatic hyperplasia)    History of hypotension          Medication list         MEDICATIONS  (STANDING):  apixaban 5 milliGRAM(s) Oral two times a day  atorvastatin 20 milliGRAM(s) Oral at bedtime  dexAMETHasone     Tablet 6 milliGRAM(s) Oral daily  finasteride 5 milliGRAM(s) Oral daily  metoprolol succinate ER 25 milliGRAM(s) Oral every 12 hours  pantoprazole    Tablet 40 milliGRAM(s) Oral daily  polyethylene glycol 3350 17 Gram(s) Oral daily  remdesivir  IVPB   IV Intermittent   remdesivir  IVPB 100 milliGRAM(s) IV Intermittent every 24 hours  tamsulosin 0.4 milliGRAM(s) Oral at bedtime    MEDICATIONS  (PRN):  acetaminophen     Tablet .. 650 milliGRAM(s) Oral every 6 hours PRN Temp greater or equal to 38C (100.4F), Mild Pain (1 - 3)  albuterol    90 MICROgram(s) HFA Inhaler 2 Puff(s) Inhalation every 6 hours PRN Shortness of Breath and/or Wheezing  aluminum hydroxide/magnesium hydroxide/simethicone Suspension 30 milliLiter(s) Oral every 4 hours PRN Dyspepsia  guaiFENesin Oral Liquid (Sugar-Free) 200 milliGRAM(s) Oral every 6 hours PRN Cough  melatonin 3 milliGRAM(s) Oral at bedtime PRN Insomnia  ondansetron Injectable 4 milliGRAM(s) IV Push every 8 hours PRN Nausea and/or Vomiting         Vitals log        ICU Vital Signs Last 24 Hrs  T(C): 36.7 (01 Dec 2022 05:06), Max: 36.9 (30 Nov 2022 11:34)  T(F): 98.1 (01 Dec 2022 05:06), Max: 98.5 (30 Nov 2022 11:34)  HR: 66 (01 Dec 2022 05:06) (55 - 82)  BP: 135/67 (01 Dec 2022 05:06) (117/55 - 144/77)  BP(mean): --  ABP: --  ABP(mean): --  RR: 18 (01 Dec 2022 05:06) (18 - 19)  SpO2: 93% (01 Dec 2022 05:06) (93% - 94%)    O2 Parameters below as of 01 Dec 2022 05:06  Patient On (Oxygen Delivery Method): room air                 Input and Output:  I&O's Detail    29 Nov 2022 07:01  -  30 Nov 2022 07:00  --------------------------------------------------------  IN:  Total IN: 0 mL    OUT:    Voided (mL): 1150 mL  Total OUT: 1150 mL    Total NET: -1150 mL      30 Nov 2022 07:01  -  01 Dec 2022 06:48  --------------------------------------------------------  IN:  Total IN: 0 mL    OUT:    Voided (mL): 600 mL  Total OUT: 600 mL    Total NET: -600 mL          Lab Data                        14.5   7.92  )-----------( 230      ( 30 Nov 2022 09:08 )             43.6     11-30    142  |  109<H>  |  20  ----------------------------<  154<H>  3.6   |  26  |  0.80    Ca    8.8      30 Nov 2022 09:08  Mg     2.0     11-29    TPro  6.8  /  Alb  2.9<L>  /  TBili  0.7  /  DBili  x   /  AST  29  /  ALT  45  /  AlkPhos  81  11-30            Review of Systems	      Objective     Physical Examination    heart s1s2  lung dec BS  head nc      Pertinent Lab findings & Imaging      Cassy:  NO   Adequate UO     I&O's Detail    29 Nov 2022 07:01  -  30 Nov 2022 07:00  --------------------------------------------------------  IN:  Total IN: 0 mL    OUT:    Voided (mL): 1150 mL  Total OUT: 1150 mL    Total NET: -1150 mL      30 Nov 2022 07:01  -  01 Dec 2022 06:48  --------------------------------------------------------  IN:  Total IN: 0 mL    OUT:    Voided (mL): 600 mL  Total OUT: 600 mL    Total NET: -600 mL               Discussed with:     Cultures:	        Radiology

## 2022-12-01 NOTE — DISCHARGE NOTE NURSING/CASE MANAGEMENT/SOCIAL WORK - NSDCPEFALRISK_GEN_ALL_CORE
For information on Fall & Injury Prevention, visit: [email or web address] OR  [email or web address] OR  https://www.cdc.gov/steadi/patient.html

## 2022-12-01 NOTE — PROGRESS NOTE ADULT - PROVIDER SPECIALTY LIST ADULT
Hospitalist
Infectious Disease
Pulmonology
Infectious Disease
Pulmonology
Infectious Disease
Hospitalist
Hospitalist

## 2022-12-02 LAB
CULTURE RESULTS: SIGNIFICANT CHANGE UP
CULTURE RESULTS: SIGNIFICANT CHANGE UP
SPECIMEN SOURCE: SIGNIFICANT CHANGE UP
SPECIMEN SOURCE: SIGNIFICANT CHANGE UP

## 2023-11-13 ENCOUNTER — EMERGENCY (EMERGENCY)
Facility: HOSPITAL | Age: 88
LOS: 1 days | Discharge: HOME | End: 2023-11-13
Attending: EMERGENCY MEDICINE | Admitting: EMERGENCY MEDICINE
Payer: MEDICARE

## 2023-11-13 VITALS
RESPIRATION RATE: 18 BRPM | DIASTOLIC BLOOD PRESSURE: 54 MMHG | OXYGEN SATURATION: 95 % | SYSTOLIC BLOOD PRESSURE: 118 MMHG | TEMPERATURE: 99 F | HEART RATE: 69 BPM

## 2023-11-13 VITALS
HEART RATE: 78 BPM | RESPIRATION RATE: 18 BRPM | OXYGEN SATURATION: 96 % | SYSTOLIC BLOOD PRESSURE: 117 MMHG | TEMPERATURE: 98 F | DIASTOLIC BLOOD PRESSURE: 65 MMHG | WEIGHT: 171.96 LBS | HEIGHT: 69 IN

## 2023-11-13 PROBLEM — Z86.79 PERSONAL HISTORY OF OTHER DISEASES OF THE CIRCULATORY SYSTEM: Chronic | Status: ACTIVE | Noted: 2022-11-27

## 2023-11-13 LAB
ALBUMIN SERPL ELPH-MCNC: 3.2 G/DL — LOW (ref 3.3–5)
ALBUMIN SERPL ELPH-MCNC: 3.2 G/DL — LOW (ref 3.3–5)
ALP SERPL-CCNC: 91 U/L — SIGNIFICANT CHANGE UP (ref 40–120)
ALP SERPL-CCNC: 91 U/L — SIGNIFICANT CHANGE UP (ref 40–120)
ALT FLD-CCNC: 28 U/L — SIGNIFICANT CHANGE UP (ref 12–78)
ALT FLD-CCNC: 28 U/L — SIGNIFICANT CHANGE UP (ref 12–78)
ANION GAP SERPL CALC-SCNC: 7 MMOL/L — SIGNIFICANT CHANGE UP (ref 5–17)
ANION GAP SERPL CALC-SCNC: 7 MMOL/L — SIGNIFICANT CHANGE UP (ref 5–17)
APTT BLD: 25.6 SEC — SIGNIFICANT CHANGE UP (ref 24.5–35.6)
APTT BLD: 25.6 SEC — SIGNIFICANT CHANGE UP (ref 24.5–35.6)
AST SERPL-CCNC: 25 U/L — SIGNIFICANT CHANGE UP (ref 15–37)
AST SERPL-CCNC: 25 U/L — SIGNIFICANT CHANGE UP (ref 15–37)
BASOPHILS # BLD AUTO: 0.03 K/UL — SIGNIFICANT CHANGE UP (ref 0–0.2)
BASOPHILS # BLD AUTO: 0.03 K/UL — SIGNIFICANT CHANGE UP (ref 0–0.2)
BASOPHILS NFR BLD AUTO: 0.3 % — SIGNIFICANT CHANGE UP (ref 0–2)
BASOPHILS NFR BLD AUTO: 0.3 % — SIGNIFICANT CHANGE UP (ref 0–2)
BILIRUB SERPL-MCNC: 1 MG/DL — SIGNIFICANT CHANGE UP (ref 0.2–1.2)
BILIRUB SERPL-MCNC: 1 MG/DL — SIGNIFICANT CHANGE UP (ref 0.2–1.2)
BUN SERPL-MCNC: 11 MG/DL — SIGNIFICANT CHANGE UP (ref 7–23)
BUN SERPL-MCNC: 11 MG/DL — SIGNIFICANT CHANGE UP (ref 7–23)
CALCIUM SERPL-MCNC: 8.7 MG/DL — SIGNIFICANT CHANGE UP (ref 8.5–10.1)
CALCIUM SERPL-MCNC: 8.7 MG/DL — SIGNIFICANT CHANGE UP (ref 8.5–10.1)
CHLORIDE SERPL-SCNC: 106 MMOL/L — SIGNIFICANT CHANGE UP (ref 96–108)
CHLORIDE SERPL-SCNC: 106 MMOL/L — SIGNIFICANT CHANGE UP (ref 96–108)
CK MB CFR SERPL CALC: <1 NG/ML — SIGNIFICANT CHANGE UP (ref 0–3.6)
CK MB CFR SERPL CALC: <1 NG/ML — SIGNIFICANT CHANGE UP (ref 0–3.6)
CO2 SERPL-SCNC: 29 MMOL/L — SIGNIFICANT CHANGE UP (ref 22–31)
CO2 SERPL-SCNC: 29 MMOL/L — SIGNIFICANT CHANGE UP (ref 22–31)
CREAT SERPL-MCNC: 0.95 MG/DL — SIGNIFICANT CHANGE UP (ref 0.5–1.3)
CREAT SERPL-MCNC: 0.95 MG/DL — SIGNIFICANT CHANGE UP (ref 0.5–1.3)
EGFR: 77 ML/MIN/1.73M2 — SIGNIFICANT CHANGE UP
EGFR: 77 ML/MIN/1.73M2 — SIGNIFICANT CHANGE UP
EOSINOPHIL # BLD AUTO: 0.04 K/UL — SIGNIFICANT CHANGE UP (ref 0–0.5)
EOSINOPHIL # BLD AUTO: 0.04 K/UL — SIGNIFICANT CHANGE UP (ref 0–0.5)
EOSINOPHIL NFR BLD AUTO: 0.5 % — SIGNIFICANT CHANGE UP (ref 0–6)
EOSINOPHIL NFR BLD AUTO: 0.5 % — SIGNIFICANT CHANGE UP (ref 0–6)
FLUAV AG NPH QL: SIGNIFICANT CHANGE UP
FLUAV AG NPH QL: SIGNIFICANT CHANGE UP
FLUBV AG NPH QL: SIGNIFICANT CHANGE UP
FLUBV AG NPH QL: SIGNIFICANT CHANGE UP
GLUCOSE SERPL-MCNC: 154 MG/DL — HIGH (ref 70–99)
GLUCOSE SERPL-MCNC: 154 MG/DL — HIGH (ref 70–99)
HCT VFR BLD CALC: 41.2 % — SIGNIFICANT CHANGE UP (ref 39–50)
HCT VFR BLD CALC: 41.2 % — SIGNIFICANT CHANGE UP (ref 39–50)
HGB BLD-MCNC: 13.6 G/DL — SIGNIFICANT CHANGE UP (ref 13–17)
HGB BLD-MCNC: 13.6 G/DL — SIGNIFICANT CHANGE UP (ref 13–17)
IMM GRANULOCYTES NFR BLD AUTO: 0.3 % — SIGNIFICANT CHANGE UP (ref 0–0.9)
IMM GRANULOCYTES NFR BLD AUTO: 0.3 % — SIGNIFICANT CHANGE UP (ref 0–0.9)
INR BLD: 1.09 RATIO — SIGNIFICANT CHANGE UP (ref 0.85–1.18)
INR BLD: 1.09 RATIO — SIGNIFICANT CHANGE UP (ref 0.85–1.18)
LYMPHOCYTES # BLD AUTO: 0.78 K/UL — LOW (ref 1–3.3)
LYMPHOCYTES # BLD AUTO: 0.78 K/UL — LOW (ref 1–3.3)
LYMPHOCYTES # BLD AUTO: 8.9 % — LOW (ref 13–44)
LYMPHOCYTES # BLD AUTO: 8.9 % — LOW (ref 13–44)
MAGNESIUM SERPL-MCNC: 1.9 MG/DL — SIGNIFICANT CHANGE UP (ref 1.6–2.6)
MAGNESIUM SERPL-MCNC: 1.9 MG/DL — SIGNIFICANT CHANGE UP (ref 1.6–2.6)
MCHC RBC-ENTMCNC: 31.3 PG — SIGNIFICANT CHANGE UP (ref 27–34)
MCHC RBC-ENTMCNC: 31.3 PG — SIGNIFICANT CHANGE UP (ref 27–34)
MCHC RBC-ENTMCNC: 33 GM/DL — SIGNIFICANT CHANGE UP (ref 32–36)
MCHC RBC-ENTMCNC: 33 GM/DL — SIGNIFICANT CHANGE UP (ref 32–36)
MCV RBC AUTO: 94.9 FL — SIGNIFICANT CHANGE UP (ref 80–100)
MCV RBC AUTO: 94.9 FL — SIGNIFICANT CHANGE UP (ref 80–100)
MONOCYTES # BLD AUTO: 0.97 K/UL — HIGH (ref 0–0.9)
MONOCYTES # BLD AUTO: 0.97 K/UL — HIGH (ref 0–0.9)
MONOCYTES NFR BLD AUTO: 11 % — SIGNIFICANT CHANGE UP (ref 2–14)
MONOCYTES NFR BLD AUTO: 11 % — SIGNIFICANT CHANGE UP (ref 2–14)
NEUTROPHILS # BLD AUTO: 6.96 K/UL — SIGNIFICANT CHANGE UP (ref 1.8–7.4)
NEUTROPHILS # BLD AUTO: 6.96 K/UL — SIGNIFICANT CHANGE UP (ref 1.8–7.4)
NEUTROPHILS NFR BLD AUTO: 79 % — HIGH (ref 43–77)
NEUTROPHILS NFR BLD AUTO: 79 % — HIGH (ref 43–77)
NRBC # BLD: 0 /100 WBCS — SIGNIFICANT CHANGE UP (ref 0–0)
NRBC # BLD: 0 /100 WBCS — SIGNIFICANT CHANGE UP (ref 0–0)
NT-PROBNP SERPL-SCNC: 643 PG/ML — HIGH (ref 0–450)
NT-PROBNP SERPL-SCNC: 643 PG/ML — HIGH (ref 0–450)
PLATELET # BLD AUTO: 230 K/UL — SIGNIFICANT CHANGE UP (ref 150–400)
PLATELET # BLD AUTO: 230 K/UL — SIGNIFICANT CHANGE UP (ref 150–400)
POTASSIUM SERPL-MCNC: 3.8 MMOL/L — SIGNIFICANT CHANGE UP (ref 3.5–5.3)
POTASSIUM SERPL-MCNC: 3.8 MMOL/L — SIGNIFICANT CHANGE UP (ref 3.5–5.3)
POTASSIUM SERPL-SCNC: 3.8 MMOL/L — SIGNIFICANT CHANGE UP (ref 3.5–5.3)
POTASSIUM SERPL-SCNC: 3.8 MMOL/L — SIGNIFICANT CHANGE UP (ref 3.5–5.3)
PROT SERPL-MCNC: 6.7 G/DL — SIGNIFICANT CHANGE UP (ref 6–8.3)
PROT SERPL-MCNC: 6.7 G/DL — SIGNIFICANT CHANGE UP (ref 6–8.3)
PROTHROM AB SERPL-ACNC: 12.7 SEC — SIGNIFICANT CHANGE UP (ref 9.5–13)
PROTHROM AB SERPL-ACNC: 12.7 SEC — SIGNIFICANT CHANGE UP (ref 9.5–13)
RBC # BLD: 4.34 M/UL — SIGNIFICANT CHANGE UP (ref 4.2–5.8)
RBC # BLD: 4.34 M/UL — SIGNIFICANT CHANGE UP (ref 4.2–5.8)
RBC # FLD: 12.9 % — SIGNIFICANT CHANGE UP (ref 10.3–14.5)
RBC # FLD: 12.9 % — SIGNIFICANT CHANGE UP (ref 10.3–14.5)
RSV RNA NPH QL NAA+NON-PROBE: SIGNIFICANT CHANGE UP
RSV RNA NPH QL NAA+NON-PROBE: SIGNIFICANT CHANGE UP
SARS-COV-2 RNA SPEC QL NAA+PROBE: DETECTED
SARS-COV-2 RNA SPEC QL NAA+PROBE: DETECTED
SODIUM SERPL-SCNC: 142 MMOL/L — SIGNIFICANT CHANGE UP (ref 135–145)
SODIUM SERPL-SCNC: 142 MMOL/L — SIGNIFICANT CHANGE UP (ref 135–145)
TROPONIN I, HIGH SENSITIVITY RESULT: 17.9 NG/L — SIGNIFICANT CHANGE UP
TROPONIN I, HIGH SENSITIVITY RESULT: 17.9 NG/L — SIGNIFICANT CHANGE UP
WBC # BLD: 8.81 K/UL — SIGNIFICANT CHANGE UP (ref 3.8–10.5)
WBC # BLD: 8.81 K/UL — SIGNIFICANT CHANGE UP (ref 3.8–10.5)
WBC # FLD AUTO: 8.81 K/UL — SIGNIFICANT CHANGE UP (ref 3.8–10.5)
WBC # FLD AUTO: 8.81 K/UL — SIGNIFICANT CHANGE UP (ref 3.8–10.5)

## 2023-11-13 PROCEDURE — 87637 SARSCOV2&INF A&B&RSV AMP PRB: CPT

## 2023-11-13 PROCEDURE — 85610 PROTHROMBIN TIME: CPT

## 2023-11-13 PROCEDURE — 70496 CT ANGIOGRAPHY HEAD: CPT | Mod: 26,MA

## 2023-11-13 PROCEDURE — 99285 EMERGENCY DEPT VISIT HI MDM: CPT

## 2023-11-13 PROCEDURE — 84484 ASSAY OF TROPONIN QUANT: CPT

## 2023-11-13 PROCEDURE — 70450 CT HEAD/BRAIN W/O DYE: CPT | Mod: MA

## 2023-11-13 PROCEDURE — 82962 GLUCOSE BLOOD TEST: CPT

## 2023-11-13 PROCEDURE — 99285 EMERGENCY DEPT VISIT HI MDM: CPT | Mod: 25

## 2023-11-13 PROCEDURE — 93010 ELECTROCARDIOGRAM REPORT: CPT

## 2023-11-13 PROCEDURE — 80053 COMPREHEN METABOLIC PANEL: CPT

## 2023-11-13 PROCEDURE — 85730 THROMBOPLASTIN TIME PARTIAL: CPT

## 2023-11-13 PROCEDURE — 36415 COLL VENOUS BLD VENIPUNCTURE: CPT

## 2023-11-13 PROCEDURE — 83880 ASSAY OF NATRIURETIC PEPTIDE: CPT

## 2023-11-13 PROCEDURE — 70498 CT ANGIOGRAPHY NECK: CPT | Mod: MA

## 2023-11-13 PROCEDURE — 71045 X-RAY EXAM CHEST 1 VIEW: CPT

## 2023-11-13 PROCEDURE — 96374 THER/PROPH/DIAG INJ IV PUSH: CPT | Mod: XU

## 2023-11-13 PROCEDURE — 70498 CT ANGIOGRAPHY NECK: CPT | Mod: 26,MA

## 2023-11-13 PROCEDURE — 85025 COMPLETE CBC W/AUTO DIFF WBC: CPT

## 2023-11-13 PROCEDURE — 82553 CREATINE MB FRACTION: CPT

## 2023-11-13 PROCEDURE — 70450 CT HEAD/BRAIN W/O DYE: CPT | Mod: 26,XU,MA

## 2023-11-13 PROCEDURE — 83735 ASSAY OF MAGNESIUM: CPT

## 2023-11-13 PROCEDURE — 71045 X-RAY EXAM CHEST 1 VIEW: CPT | Mod: 26

## 2023-11-13 PROCEDURE — 93005 ELECTROCARDIOGRAM TRACING: CPT

## 2023-11-13 PROCEDURE — 70496 CT ANGIOGRAPHY HEAD: CPT | Mod: MA

## 2023-11-13 RX ORDER — HYDROCHLOROTHIAZIDE 25 MG
1 TABLET ORAL
Refills: 0 | DISCHARGE

## 2023-11-13 RX ORDER — MOLNUPIRAVIR 200 MG/1
4 CAPSULE ORAL
Qty: 40 | Refills: 0
Start: 2023-11-13 | End: 2023-11-17

## 2023-11-13 RX ORDER — METOPROLOL TARTRATE 50 MG
1 TABLET ORAL
Refills: 0 | DISCHARGE

## 2023-11-13 RX ORDER — SODIUM CHLORIDE 9 MG/ML
1000 INJECTION INTRAMUSCULAR; INTRAVENOUS; SUBCUTANEOUS ONCE
Refills: 0 | Status: COMPLETED | OUTPATIENT
Start: 2023-11-13 | End: 2023-11-13

## 2023-11-13 RX ORDER — ONDANSETRON 8 MG/1
4 TABLET, FILM COATED ORAL ONCE
Refills: 0 | Status: COMPLETED | OUTPATIENT
Start: 2023-11-13 | End: 2023-11-13

## 2023-11-13 RX ORDER — SODIUM CHLORIDE 9 MG/ML
1000 INJECTION INTRAMUSCULAR; INTRAVENOUS; SUBCUTANEOUS ONCE
Refills: 0 | Status: DISCONTINUED | OUTPATIENT
Start: 2023-11-13 | End: 2023-11-13

## 2023-11-13 RX ORDER — APIXABAN 2.5 MG/1
1 TABLET, FILM COATED ORAL
Refills: 0 | DISCHARGE

## 2023-11-13 RX ORDER — DUTASTERIDE 0.5 MG/1
1 CAPSULE, LIQUID FILLED ORAL
Refills: 0 | DISCHARGE

## 2023-11-13 RX ORDER — MECLIZINE HCL 12.5 MG
25 TABLET ORAL ONCE
Refills: 0 | Status: COMPLETED | OUTPATIENT
Start: 2023-11-13 | End: 2023-11-13

## 2023-11-13 RX ADMIN — Medication 25 MILLIGRAM(S): at 10:16

## 2023-11-13 RX ADMIN — SODIUM CHLORIDE 1000 MILLILITER(S): 9 INJECTION INTRAMUSCULAR; INTRAVENOUS; SUBCUTANEOUS at 10:15

## 2023-11-13 RX ADMIN — ONDANSETRON 4 MILLIGRAM(S): 8 TABLET, FILM COATED ORAL at 10:16

## 2023-11-13 NOTE — PHARMACOTHERAPY INTERVENTION NOTE - COMMENTS
Procedure   Large Joint Arthrocentesis: R glenohumeral  Date/Time: 1/28/2020 8:10 AM  Consent given by: patient  Site marked: site marked  Timeout: Immediately prior to procedure a time out was called to verify the correct patient, procedure, equipment, support staff and site/side marked as required   Supporting Documentation  Indications: pain   Procedure Details  Location: shoulder - R glenohumeral  Preparation: Patient was prepped and draped in the usual sterile fashion  Needle size: 22 G  Approach: posterior  Medications administered: 4 mL lidocaine PF 1% 1 %; 40 mg methylPREDNISolone acetate 40 MG/ML  Patient tolerance: patient tolerated the procedure well with no immediate complications            Patient is a 86 yo male presenting with chief complaint of dizziness. Medication reconciliation requested by DAVE Tineo. Medication history completed with patient and wife at bedside, patient's son over the phone and [facility] in [place], [place]. Medication reconciliation completed, findings relayed to the team.

## 2023-11-13 NOTE — ED PROVIDER NOTE - NS ED MD DISPO DISCHARGE CCDA
Back Pain (Acute Or Chronic)    Back pain is one of the most common problems The good news is that most people feel better in 1 to 2 weeks, and most of the rest in 1 to 2 months. Most people can remain active.  Symptoms  People experience and describe pain differently; not everyone is the same.  · The pain can be sharp, stabbing, shooting, aching, cramping or burning.  · Movement, standing, bending, lifting, sitting, or walking may worsen pain.  · It can be localized to one spot or area, or it can be more generalized.  · It can spread or radiate upwards, to the front, or go down your arms or legs (sciatica).  · It can cause muscle spasm.  Causes  Most of the time, \"mechanical problems\" with the muscles or spine cause the pain. Mechanical problems are usually caused by an injury to the muscles or ligaments. While illness can cause back pain, it is usually not caused by a serious illness.  · Physical activity such as sports, exercise, work, or normal activity  · Overexertion, lifting, pushing, pulling incorrectly or too aggressively  · Sudden twisting, bending, or stretching from an accident, or accidental movement  · Poor posture  · Stretching or moving wrong, without noticing pain at the time  · Poor coordination, lack of regular exercise (check with your doctor about this)  · Spinal disc disease or arthritis  · Stress  Pain can also be related to pregnancy, or illness like appendicitis, bladder or kidney infections, pelvic infections, and many other things.     Acute back pain usually gets better in 1 to 2 weeks. Back pain related to disk disease, arthritis in the spinal joints or spinal stenosis (narrowing of the spinal canal) can become chronic and last for months or years.  Unless you had a physical injury (for example, a car accident or fall) X-rays are usually not needed for the initial evaluation of back pain. If pain continues and does not respond to medical treatment, X-rays and other tests may be done at  a later time.  Home care  Try these home care recommendations:  1. When in bed, try to find a position of comfort. A firm mattress is best. Try lying flat on your back with pillows under your knees. You can also try lying on your side with your knees bent up towards your chest and a pillow between your knees.  2. At first, do not try to stretch out the sore spots. If there is a strain, it is not like the good soreness you get after exercising without an injury. In this case, stretching may make it worse.  3. Avoid prolong sitting, long car rides, or travel. This puts more stress on the lower back than standing or walking.  4. During the first 24 to 72 hours after an acute injury or flare up of chronic back pain, apply an ice pack to the painful area for 20 minutes and then remove it for 20 minutes over a period of 60 to 90 minutes or several times a day. This will reduce swelling and pain. Wrap the ice pack in a think towel or plastic to protect your skin.  5. You can start with ice, then switch to heat. Heat (hot shower, hot bath, or heating pad) reduces swelling and pain and works well for muscle spasms. Heat can be applied to the painful area for 20 minutes then remove it for 20 minutes over a period of 60 to 90 minutes or several times a day. Do not sleep on a heating pad. It can lead to skin burns or tissue damage.  6. You can alternate ice and heat therapy. Talk with your doctor about the best treatment for your back pain.  7. Therapeutic massage can help relax the back muscles without stretching them.  8. Be aware of safe lifting methods and do not lift anything without stretching first.  Medicines  Talk to your doctor before using medications, especially if you have other medical problems or are taking other medicines.  · You may use acetaminophen or ibuprofen to control pain, unless another pain medicine was prescribed. If you have chronic conditions like diabetes, liver or kidney disease, stomach ulcers,  or gastrointestinal bleeding, or are taking blood thinners talk to your doctor before taking any medication.  · Be careful if you are given a prescription medicines, narcotics, or medication for muscle spasms. They can cause drowsiness, affect your coordination, reflexes, and judgement. Do not drive or operate heavy machinery.  Follow-up care  Follow up with your doctor or this facility if your symptoms do not start to improve after one week. Physical therapy may be needed.  If X-rays were taken, they will be reviewed by a radiologist. You will be notified of any new findings that may affect your care.  Call 911  Call emergency services if any of the following occur:  · Trouble breathing  · Confusion  · Very drowsy or trouble awakening  · Fainting or loss of consciousness  · Rapid or very slow heart rate  · Loss of bowel or bladder control  When to seek medical care  Get prompt medical attention if any of the following occur:  · Pain becomes worse or spreads to your legs  · Weakness or numbness in one or both legs  · Numbness in the groin or genital area  © 7340-5755 [email or web address]. [address] [place] [address], [place] [address]. All rights reserved. This information is not intended as a substitute for professional medical care. Always follow your healthcare professional's instructions.         Patient/Caregiver provided printed discharge information.

## 2023-11-13 NOTE — ED PROVIDER NOTE - PROGRESS NOTE DETAILS
med rec by pharmacy. pt on eliquis- as per discussion with attending/pharmacy, decision made to rx molnupiravir. discussed with pt/wife, agree.  Discussed the results of all diagnostic testing in ED and copies of all reports given.   Pt was given an opportunity to have all questions answered to satisfaction.  Discussed the importance of prompt, close medical follow-up. ED return precautions discussed at length.  Pt verbalizes agreement and understanding of plan and ED return precautions. Pt well appearing, at his baseline, stable for DC home. No emergent concerns at this time.

## 2023-11-13 NOTE — ED PROVIDER NOTE - PATIENT PORTAL LINK FT
You can access the FollowMyHealth Patient Portal offered by [facility] by registering at the following website: [email or web address]. By joining [facility]’s FollowMyHealth portal, you will also be able to view your health information using other applications (apps) compatible with our system.

## 2023-11-13 NOTE — ED PROVIDER NOTE - WR ORDER STATUS 1
Impression: Encounter for surgical aftercare following surgery on a sense organ: [id number] OD.  Plan: well positioned Performed

## 2023-11-13 NOTE — ED PROVIDER NOTE - CARDIAC, MLM
amitriptyline (ELAVIL) 10 MG tablet 360 tablet 0 10/19/2020     Sig - Route: TAKE 4 TABLETS BY MOUTH NIGHTLY AS NEEDED FOR SLEEP OR PAIN. - Oral      9/8/2020 Last office visit  Wt Readings from Last 1 Encounters:   12/10/20 85.7 kg        BP Readings from Last 2 Encounters:   12/10/20 130/80   12/08/20 128/80   ]    Lab Results   Component Value Date    SODIUM 145 12/08/2020    POTASSIUM 4.1 12/08/2020    CHLORIDE 107 12/08/2020    CO2 27 12/08/2020    BUN 24 (H) 12/08/2020    CREATININE 0.57 (L) 12/08/2020    GLUCOSE 125 (H) 12/08/2020     Hemoglobin A1C (%)   Date Value   09/08/2020 6.4 (H)   12/10/2019 5.9 (H)     TSH (mcUnits/mL)   Date Value   09/20/2018 2.248     Lab Results   Component Value Date    CHOLESTEROL 127 12/08/2020    HDL 42 12/08/2020    CALCLDL 60 12/08/2020    TRIGLYCERIDE 127 12/08/2020     Lab Results   Component Value Date    AST 13 12/08/2020    GPT 13 12/08/2020    ALKPT 79 12/08/2020    BILIRUBIN 0.2 12/08/2020       
Normal rate, regular rhythm.  Heart sounds S1, S2.  No murmurs, rubs or gallops.

## 2023-11-13 NOTE — ED ADULT NURSE NOTE - NS ED NURSE RECORD ANOTHER HT AND WT
Yes
Coagulation: Bleeding disorder either through use of anticoagulants or underlying clinical condition(s)

## 2023-11-13 NOTE — ED PROVIDER NOTE - NIH STROKE SCALE: 9. BEST LANGUAGE, QM
(0) No aphasia; normal LABS/RADIOLOGY RESULTS:                          13.7   8.65  )-----------( 184      ( 07 Aug 2021 21:11 )             40.4   08-07    138  |  100  |  16  ----------------------------<  164<H>  4.3   |  26  |  0.9    Ca    9.5      07 Aug 2021 21:11  Mg     2.0     08-07    TPro  7.3  /  Alb  4.6  /  TBili  0.7  /  DBili  x   /  AST  23  /  ALT  15  /  AlkPhos  85  08-07  PT/INR - ( 08 Aug 2021 05:13 )   PT: 13.10 sec;   INR: 1.14 ratio         PTT - ( 08 Aug 2021 05:13 )  PTT:29.8 secBlood Cultures    < from: 12 Lead ECG (08.07.21 @ 21:08) >    Ventricular Rate 90 BPM    Atrial Rate 66 BPM    QRS Duration 110 ms    Q-T Interval 364 ms    QTC Calculation(Bazett) 445 ms    R Axis 78 degrees    T Axis 258 degrees    Diagnosis Line Atrial fibrillation  RSR' or QR pattern in V1 suggests right ventricular conduction delay  ST & T wave abnormality, consider inferior ischemia  ST & T wave abnormality, consider anterolateral ischemia  Abnormal ECG    < end of copied text >    Troponin T, Serum: 0.02 ng/mL (08.07.21 @ 21:11) LABS/RADIOLOGY RESULTS:                          13.7   8.65  )-----------( 184      ( 07 Aug 2021 21:11 )             40.4   08-07    138  |  100  |  16  ----------------------------<  164<H>  4.3   |  26  |  0.9    Ca    9.5      07 Aug 2021 21:11  Mg     2.0     08-07    TPro  7.3  /  Alb  4.6  /  TBili  0.7  /  DBili  x   /  AST  23  /  ALT  15  /  AlkPhos  85  08-07  PT/INR - ( 08 Aug 2021 05:13 )   PT: 13.10 sec;   INR: 1.14 ratio         PTT - ( 08 Aug 2021 05:13 )  PTT:29.8 secBlood Cultures    < from: 12 Lead ECG (08.07.21 @ 21:08) >    Ventricular Rate 90 BPM    Atrial Rate 66 BPM    QRS Duration 110 ms    Q-T Interval 364 ms    QTC Calculation(Bazett) 445 ms    R Axis 78 degrees    T Axis 258 degrees    Diagnosis Line Atrial fibrillation  RSR' or QR pattern in V1 suggests right ventricular conduction delay  ST & T wave abnormality, consider inferior ischemia  ST & T wave abnormality, consider anterolateral ischemia  Abnormal ECG    < end of copied text >    Troponin T, Serum: 0.02 ng/mL (08.07.21 @ 21:11)    < from: CT Angio Abd Aorta w/run-off w/ IV Cont (08.08.21 @ 04:28) >    IMPRESSION:    RIGHT LOWER EXTREMITY:  Gradual loss ofcontrast opacification of the right popliteal artery with reconstituted single vessel runoff to the right lower extremity.    LEFT LOWER EXTREMITY:  Occlusion of the left popliteal artery.  Reconstitution of a single vessel runoff to the left lower extremity.    < end of copied text >

## 2023-11-13 NOTE — ED PROVIDER NOTE - OBJECTIVE STATEMENT
88 yo M PMHx  HTN, AFib BIBEMS c/o dizziness this AM. Pt states he was sitting down about to have breakfast when he suddenly felt dizzy, as if he'd pass out. Symptoms lasted for a few minutes, feels back to baseline, states he didn't want to come to ED. Pt reports that wife has had flu-like symptoms for about 1 week, he too developed dry cough and nasal congestion a few days ago, improved now. Pt denies HA, chest pain, back pain, abd pain, N/V/D, fever/chills, urinary symptoms.

## 2023-11-13 NOTE — ED ADULT NURSE NOTE - OBJECTIVE STATEMENT
86y/o male A&ox4, ambulatory received in rm4a. pt c/o dizziness for few days, feels like he is "going to black out." denies falls, dizziness with movement, headache, cp, sob. pt following commands appropriately, respirations even and unlabored, completing full sentences. 20g iv placed in LAC, labs sent. md at bedside for eval. bed in lowest position, side rails up, call bell in hand, safety maintained. awaiting further orders. will continue to monitor.

## 2023-11-13 NOTE — ED PROVIDER NOTE - NS ED ATTENDING STATEMENT MOD
I have seen and examined this patient and fully participated in the care of this patient as the teaching attending.  The service was shared with the [facility]DO.  I reviewed and verified the documentation and independently performed the documented:

## 2023-11-13 NOTE — ED ADULT TRIAGE NOTE - CHIEF COMPLAINT QUOTE
Patient A/Ox4 with clear speech. BIBA from home for dizziness for a few days. Feels like he is going to black out.

## 2023-11-13 NOTE — ED PROVIDER NOTE - ATTENDING CONTRIBUTION TO CARE
87-year-old male PMH of A-fib on Eliquis, BPH, HLD, CAD with 4 cardiac stents, presents to the emergency department by ambulance with report of dizziness, near syncope, patient states that yesterday he had cough and congestion, this morning patient states he did not feel well, felt dizziness, room spinning, worse when trying to get up or move his head, better with laying down, patient awake and alert at this time, no slurred speech, no facial droop, no focal weakness, heart lungs clear, abdomen soft, patient nontoxic-appearing, follow-up EKG, CBC, CMP, cardiac enzymes, placed on cardiac monitor, obtain CT head, CT angio head and neck, IV fluids, send flu/COVID/RSV swab, give Zofran and meclizine and reevaluate.

## 2024-02-06 NOTE — PATIENT PROFILE ADULT - NSPROMEDSBROUGHTTOHOSP_GEN_A_NUR
Group Topic: BH Process Group     Date: 2/6/2024  Start Time: 1430  End Time: 1530  Facilitators: Yesi Jain    Focus: Socialization and Recreation  Number in attendance: 8    Method: Group  Attendance: Present  Participation: Minimal  Patient Response: Quiet and Select interactions  Mood: Anxious and Depressed  Affect: Type: Anxious and Depressed   Range: Blunted/flat   Congruency: Congruent   Stability: Stable  Behavior/Socialization: Appropriate to group and Withdrawn  Thought Process: Unremarkable  Task Performance: Needs cueing  Patient Evaluation: Encouragement - needs prompts       no

## 2024-03-18 NOTE — ED ADULT NURSE NOTE - NS TRANSFER PATIENT BELONGINGS
Encounter addended by: Iwona Barrera RN on: 3/18/2024 1:05 PM   Actions taken: Charge Capture section accepted Clothing

## 2024-07-26 NOTE — ED ADULT NURSE NOTE - CAS TRG GENERAL AIRWAY, MLM
[place]    EMERGENCY DEPARTMENT ENCOUNTER      Pt Name: Ron Foster  MRN: [id number]  YOB: 1970  Date of evaluation: 7/26/2024  Provider: Gerald Foss MD    CHIEF COMPLAINT       Chief Complaint   Patient presents with    Stroke         HISTORY OF PRESENT ILLNESS   Ron Foster is a 53 y.o. male who presents to the emergency department with complaint of sudden onset severe left-sided weakness and numbness along with difficulty with speech that began at around 3:45 PM while the patient was driving.  He denies any associated headache or neck pain.  Was picked up by EMS and noted to be significantly hypertensive.  He is not taking any anticoagulant medications.  Denies any trauma.      Nursing notes were reviewed.    REVIEW OF SYSTEMS     ROS:  A chief complaint appropriate review of systems was completed and is negative except as noted in the HPI.      PAST MEDICAL HISTORY   No past medical history on file.      SURGICAL HISTORY     No past surgical history on file.      CURRENT MEDICATIONS       Current Facility-Administered Medications:     iopamidol (ISOVUE-370) 76 % injection 75 mL, 75 mL, Intravenous, Once in imaging, Gerald Foss MD    niCARdipine (CARDENE) 25mg in 250mL NS infusion, 5-15 mg/hr, Intravenous, Titrated, Gerald Foss MD    sodium chloride 0.9 % flush 10 mL, 10 mL, Intravenous, PRN, Gerald Foss MD  No current outpatient medications on file.    ALLERGIES     Patient has no known allergies.    FAMILY HISTORY     No family history on file.       SOCIAL HISTORY            PHYSICAL EXAM    (up to 7 for level 4, 8 or more for level 5)     Vitals:    07/26/24 1706 07/26/24 1715   BP: (!) 196/112 (!) 196/118   BP Location:  Right arm   Patient Position:  Lying   Pulse:  92   Resp:  20   SpO2:  (S) 95%       General: Awake, alert, no acute distress.  HEENT: Conjunctivae normal.  Neck: Trachea midline.  Cardiac: Heart regular rate, rhythm, no murmurs, rubs, or  gallops  Lungs: Lungs are clear to auscultation, there is no wheezing, rhonchi, or rales. There is no use of accessory muscles.  Chest wall: There is no tenderness to palpation over the chest wall or over ribs  Abdomen: Abdomen is soft, nontender, nondistended. There are no firm or pulsatile masses, no rebound rigidity or guarding.   Musculoskeletal: No deformity.  Neuro: Alert and oriented x 4.  Severe left-sided weakness, numbness, and neglect.  Left facial droop.  Dysarthria.  No aphasia.  Dermatology: Skin is warm and dry  Psych: Mentation is grossly normal, cognition is grossly normal. Affect is appropriate.        DIAGNOSTIC RESULTS     EKG: All EKGs are interpreted by the Emergency Department Physician who either signs or Co-signs this chart in the absence of a cardiologist.    ECG 12 Lead ED Triage Standing Order; Acute Stroke (Onset <24 hrs)    (Results Pending)         RADIOLOGY:   [x] Radiologist's Report Reviewed:  CT Head Without Contrast Stroke Protocol    (Results Pending)   CT Angiogram Head w AI Analysis of LVO    (Results Pending)   CT Angiogram Neck    (Results Pending)   XR Chest 1 View    (Results Pending)       I ordered and independently reviewed the above noted radiographic studies.        LABS:    I have reviewed and interpreted all of the currently available lab results from this visit (if applicable):  Results for orders placed or performed during the hospital encounter of 07/26/24   CBC Auto Differential    Specimen: Blood   Result Value Ref Range    WBC 6.50 3.40 - 10.80 10*3/mm3    RBC 4.91 4.14 - 5.80 10*6/mm3    Hemoglobin 15.3 13.0 - 17.7 g/dL    Hematocrit 43.2 37.5 - 51.0 %    MCV 88.0 79.0 - 97.0 fL    MCH 31.2 26.6 - 33.0 pg    MCHC 35.4 31.5 - 35.7 g/dL    RDW 12.7 12.3 - 15.4 %    RDW-SD 41.4 37.0 - 54.0 fl    MPV 8.6 6.0 - 12.0 fL    Platelets 191 140 - 450 10*3/mm3    Neutrophil % 68.9 42.7 - 76.0 %    Lymphocyte % 20.2 19.6 - 45.3 %    Monocyte % 8.6 5.0 - 12.0 %     Eosinophil % 1.5 0.3 - 6.2 %    Basophil % 0.6 0.0 - 1.5 %    Immature Grans % 0.2 0.0 - 0.5 %    Neutrophils, Absolute 4.48 1.70 - 7.00 10*3/mm3    Lymphocytes, Absolute 1.31 0.70 - 3.10 10*3/mm3    Monocytes, Absolute 0.56 0.10 - 0.90 10*3/mm3    Eosinophils, Absolute 0.10 0.00 - 0.40 10*3/mm3    Basophils, Absolute 0.04 0.00 - 0.20 10*3/mm3    Immature Grans, Absolute 0.01 0.00 - 0.05 10*3/mm3    nRBC 0.0 0.0 - 0.2 /100 WBC   POC CHEM 8    Specimen: Blood   Result Value Ref Range    Glucose 124 70 - 130 mg/dL    BUN 17 8 - 26 mg/dL    Creatinine 0.90 0.60 - 1.30 mg/dL    Sodium 137 (L) 138 - 146 mmol/L    POC Potassium 3.4 (L) 3.5 - 4.9 mmol/L    Chloride 102 98 - 109 mmol/L    Total CO2 23 (L) 24 - 29 mmol/L    Hemoglobin 15.0 12.0 - 17.0 g/dL    Hematocrit 44 38 - 51 %    Ionized Calcium 1.10 (L) 1.20 - 1.32 mmol/L    eGFR 102.1 >60.0 mL/min/1.73   Green Top (Gel)   Result Value Ref Range    Extra Tube Hold for add-ons.    Lavender Top   Result Value Ref Range    Extra Tube hold for add-on    Gold Top - SST   Result Value Ref Range    Extra Tube Hold for add-ons.    Gray Top   Result Value Ref Range    Extra Tube Hold for add-ons.    Light Blue Top   Result Value Ref Range    Extra Tube Hold for add-ons.         If labs were ordered, I independently reviewed the results and considered them in treating the patient.      EMERGENCY DEPARTMENT COURSE and DIFFERENTIAL DIAGNOSIS/MDM:   Vitals:  AS OF 17:18 EDT    BP - (!) 196/118  HR - 92  TEMP -    O2 SATS - (S) 95%        Discussion below represents my analysis of pertinent findings related to patient's condition, differential diagnosis, treatment plan and final disposition.      Differential diagnosis:  The differential diagnosis associated with the patient's presentation includes: CVA, intracranial hemorrhage, intracranial mass, seizure, migraine, electrolyte derangement      Independent interpretations (ECG/rhythm strip/X-ray/US/CT scan): I independently  interpreted the patient's head CT and cardiac monitor.  Right-sided intraparenchymal hemorrhage and the patient is in sinus rhythm.      Additional sources:  Discussed/obtained information from independent historians:   [] Spouse:   [] Parent:   [] Friend:   [x] EMS: Reports taken from EMS.  Patient hypertensive during transport.   [] Other:  External (non-ED) record review:   [] Inpatient record:   [] Office record:   [] Outpatient record:   [] Prior Outpatient labs:   [] Prior Outpatient radiology:   [] Primary Care record:   [] Outside ED record:   [] Other:       Patient's care impacted by:   [] Diabetes   [x] Hypertension   [] Coronary Artery Disease   [] Cancer   [] Other:     Care significantly affected by Social Determinants of Health (housing and economic circumstances, unemployment)    [] Yes     [x] No   If yes, Patient's care significantly limited by  Social Determinants of Health including:    [] Inadequate housing    [] Low income    [] Alcoholism and drug addiction in family    [] Problems related to primary support group    [] Unemployment    [] Problems related to employment    [] Other Social Determinants of Health:       Consideration of admission/observation vs discharge: This patient presents with intraparenchymal hemorrhage with severe neurologic deficits and requires admission for further management      I considered prescription management with:    [] Pain medication:   [] Antiviral:   [] Antibiotic:   [x] Other: Patient started on Cardene infusion    ED Course:    ED Course as of 07/26/24 1718 Fri Jul 26, 2024   1658 Paged neurosurgery [NS]   1705 Discussed w aKne LOGANGY. Discussed history, presentation, workup. Will speak w Dr. Painter. Plan for ICU admission. [NS]   1717 I discussed case with intensivist Dr. Rosa.  I discussed patient history, presentation, workup.  Accepts patient for ICU admission. [NS]   1718 Patient presents with large intraparenchymal hemorrhage with  significant neurologic deficits.  Neurosurgery is evaluating the patient.  Patient is being admitted to the ICU with Cardene infusion. [NS]      ED Course User Index  [NS] Gerald Foss MD           CRITICAL CARE TIME    Approximately 35 minutes of discontinuous critical care time was provided to this patient by myself absent of any time spent performing procedures.  Patient presents critically ill with acute intraparenchymal hemorrhage with associated hypertensive emergency placing the neurologic system at risk requiring the following interventions: Initiation of Cardene infusion, interpretation of labs/ECG/imaging, frequent reassessment, specialist consultation, coordination of ICU admission with the following response: Improvement in blood pressure.  Patient at high risk of deterioration and possibly death without these interventions.      FINAL IMPRESSION      1. Intraparenchymal hemorrhage of brain    2. Dysarthria    3. Acute left-sided weakness    4. Left sided numbness    5. Hemineglect    6. Hypertensive emergency          DISPOSITION/PLAN     ED Disposition       ED Disposition   Decision to Admit    Condition   --    Comment   Level of Care: Critical Care [6]   Admitting Physician: SRUTHI RANGEL [[id number]]   Attending Physician: SRUTHI RANGEL [[id number]]                   Comment: Please note this report has been produced using speech recognition software.      Gerald Foss MD  Attending Emergency Physician             Gerald Fsos MD  07/26/24 [id number]     Patent

## 2024-11-18 NOTE — ED ADULT NURSE NOTE - CHPI ED NUR HIGHEST TEMPERATURE
Procedure:  COLONOSCOPY    Relevant Problems   No relevant active problems        Physical Exam    Airway    Mallampati score: II  TM Distance: >3 FB  Neck ROM: full     Dental   No notable dental hx     Cardiovascular      Pulmonary      Other Findings  post-pubertal.      Anesthesia Plan  ASA Score- 2     Anesthesia Type- IV sedation with anesthesia with ASA Monitors.         Additional Monitors:     Airway Plan:            Plan Factors-Exercise tolerance (METS): >4 METS.    Chart reviewed.   Existing labs reviewed. Patient summary reviewed.                  Induction- intravenous.    Postoperative Plan-         Informed Consent- Anesthetic plan and risks discussed with patient.  I personally reviewed this patient with the CRNA. Discussed and agreed on the Anesthesia Plan with the CRNA..         fahrenheit